# Patient Record
Sex: FEMALE | Race: WHITE | NOT HISPANIC OR LATINO
[De-identification: names, ages, dates, MRNs, and addresses within clinical notes are randomized per-mention and may not be internally consistent; named-entity substitution may affect disease eponyms.]

---

## 2017-09-12 ENCOUNTER — APPOINTMENT (OUTPATIENT)
Dept: MAMMOGRAPHY | Facility: IMAGING CENTER | Age: 62
End: 2017-09-12

## 2017-09-17 ENCOUNTER — TRANSCRIPTION ENCOUNTER (OUTPATIENT)
Age: 62
End: 2017-09-17

## 2017-09-26 ENCOUNTER — RESULT REVIEW (OUTPATIENT)
Age: 62
End: 2017-09-26

## 2017-09-27 ENCOUNTER — APPOINTMENT (OUTPATIENT)
Dept: OBGYN | Facility: CLINIC | Age: 62
End: 2017-09-27
Payer: COMMERCIAL

## 2017-09-27 PROCEDURE — 99396 PREV VISIT EST AGE 40-64: CPT

## 2017-10-03 ENCOUNTER — OUTPATIENT (OUTPATIENT)
Dept: OUTPATIENT SERVICES | Facility: HOSPITAL | Age: 62
LOS: 1 days | End: 2017-10-03
Payer: COMMERCIAL

## 2017-10-03 ENCOUNTER — APPOINTMENT (OUTPATIENT)
Dept: ULTRASOUND IMAGING | Facility: IMAGING CENTER | Age: 62
End: 2017-10-03
Payer: COMMERCIAL

## 2017-10-03 ENCOUNTER — APPOINTMENT (OUTPATIENT)
Dept: MAMMOGRAPHY | Facility: IMAGING CENTER | Age: 62
End: 2017-10-03
Payer: COMMERCIAL

## 2017-10-03 DIAGNOSIS — Z00.8 ENCOUNTER FOR OTHER GENERAL EXAMINATION: ICD-10-CM

## 2017-10-03 PROCEDURE — 77063 BREAST TOMOSYNTHESIS BI: CPT

## 2017-10-03 PROCEDURE — 77067 SCR MAMMO BI INCL CAD: CPT

## 2017-10-03 PROCEDURE — G0202: CPT | Mod: 26

## 2017-10-03 PROCEDURE — 77063 BREAST TOMOSYNTHESIS BI: CPT | Mod: 26

## 2017-10-03 PROCEDURE — 76641 ULTRASOUND BREAST COMPLETE: CPT | Mod: 26,50

## 2017-10-03 PROCEDURE — 76641 ULTRASOUND BREAST COMPLETE: CPT

## 2018-10-05 ENCOUNTER — APPOINTMENT (OUTPATIENT)
Dept: OBGYN | Facility: CLINIC | Age: 63
End: 2018-10-05
Payer: COMMERCIAL

## 2018-10-05 ENCOUNTER — RESULT REVIEW (OUTPATIENT)
Age: 63
End: 2018-10-05

## 2018-10-05 PROCEDURE — 99396 PREV VISIT EST AGE 40-64: CPT

## 2018-12-21 ENCOUNTER — OUTPATIENT (OUTPATIENT)
Dept: OUTPATIENT SERVICES | Facility: HOSPITAL | Age: 63
LOS: 1 days | End: 2018-12-21
Payer: COMMERCIAL

## 2018-12-21 ENCOUNTER — APPOINTMENT (OUTPATIENT)
Dept: ULTRASOUND IMAGING | Facility: IMAGING CENTER | Age: 63
End: 2018-12-21
Payer: COMMERCIAL

## 2018-12-21 ENCOUNTER — APPOINTMENT (OUTPATIENT)
Dept: MAMMOGRAPHY | Facility: IMAGING CENTER | Age: 63
End: 2018-12-21
Payer: COMMERCIAL

## 2018-12-21 ENCOUNTER — APPOINTMENT (OUTPATIENT)
Dept: RADIOLOGY | Facility: IMAGING CENTER | Age: 63
End: 2018-12-21
Payer: COMMERCIAL

## 2018-12-21 DIAGNOSIS — Z00.8 ENCOUNTER FOR OTHER GENERAL EXAMINATION: ICD-10-CM

## 2018-12-21 PROCEDURE — 77080 DXA BONE DENSITY AXIAL: CPT

## 2018-12-21 PROCEDURE — 77067 SCR MAMMO BI INCL CAD: CPT | Mod: 26

## 2018-12-21 PROCEDURE — 76641 ULTRASOUND BREAST COMPLETE: CPT | Mod: 26,50

## 2018-12-21 PROCEDURE — 77063 BREAST TOMOSYNTHESIS BI: CPT | Mod: 26

## 2018-12-21 PROCEDURE — 77063 BREAST TOMOSYNTHESIS BI: CPT

## 2018-12-21 PROCEDURE — 77080 DXA BONE DENSITY AXIAL: CPT | Mod: 26

## 2018-12-21 PROCEDURE — 77067 SCR MAMMO BI INCL CAD: CPT

## 2018-12-21 PROCEDURE — 76641 ULTRASOUND BREAST COMPLETE: CPT

## 2019-03-13 ENCOUNTER — APPOINTMENT (OUTPATIENT)
Dept: INTERNAL MEDICINE | Facility: CLINIC | Age: 64
End: 2019-03-13
Payer: COMMERCIAL

## 2019-03-13 VITALS
HEART RATE: 79 BPM | OXYGEN SATURATION: 99 % | HEIGHT: 65 IN | DIASTOLIC BLOOD PRESSURE: 80 MMHG | SYSTOLIC BLOOD PRESSURE: 154 MMHG | WEIGHT: 117 LBS | TEMPERATURE: 98.3 F | BODY MASS INDEX: 19.49 KG/M2

## 2019-03-13 VITALS — DIASTOLIC BLOOD PRESSURE: 78 MMHG | SYSTOLIC BLOOD PRESSURE: 140 MMHG

## 2019-03-13 DIAGNOSIS — Z87.891 PERSONAL HISTORY OF NICOTINE DEPENDENCE: ICD-10-CM

## 2019-03-13 DIAGNOSIS — Z78.9 OTHER SPECIFIED HEALTH STATUS: ICD-10-CM

## 2019-03-13 DIAGNOSIS — Z82.49 FAMILY HISTORY OF ISCHEMIC HEART DISEASE AND OTHER DISEASES OF THE CIRCULATORY SYSTEM: ICD-10-CM

## 2019-03-13 DIAGNOSIS — Z82.0 FAMILY HISTORY OF EPILEPSY AND OTHER DISEASES OF THE NERVOUS SYSTEM: ICD-10-CM

## 2019-03-13 PROCEDURE — G0444 DEPRESSION SCREEN ANNUAL: CPT

## 2019-03-13 PROCEDURE — 99214 OFFICE O/P EST MOD 30 MIN: CPT | Mod: 25

## 2019-03-13 PROCEDURE — G0442 ANNUAL ALCOHOL SCREEN 15 MIN: CPT

## 2019-03-13 PROCEDURE — 99386 PREV VISIT NEW AGE 40-64: CPT

## 2019-03-13 RX ORDER — IBUPROFEN 200 MG
CAPSULE ORAL
Refills: 0 | Status: ACTIVE | COMMUNITY

## 2019-03-13 NOTE — HEALTH RISK ASSESSMENT
[Good] : ~his/her~  mood as  good [] : No [No falls in past year] : Patient reported no falls in the past year [0] : 2) Feeling down, depressed, or hopeless: Not at all (0) [[id number]] : 0 [Change in mental status noted] : No change in mental status noted [Language] : denies difficulty with language [Behavior] : denies difficulty with behavior [Learning/Retaining New Information] : denies difficulty learning/retaining new information [Handling Complex Tasks] : denies difficulty handling complex tasks [Reasoning] : denies difficulty with reasoning [Spatial Ability and Orientation] : denies difficulty with spatial ability and orientation [None] : None [With Family] : lives with family [Employed] : employed [] :  [Sexually Active] : sexually active [High Risk Behavior] : no high risk behavior [Feels Safe at Home] : Feels safe at home [Fully functional (bathing, dressing, toileting, transferring, walking, feeding)] : Fully functional (bathing, dressing, toileting, transferring, walking, feeding) [Fully functional (using the telephone, shopping, preparing meals, housekeeping, doing laundry, using] : Fully functional and needs no help or supervision to perform IADLs (using the telephone, shopping, preparing meals, housekeeping, doing laundry, using transportation, managing medications and managing finances) [Reports changes in hearing] : Reports no changes in hearing [Reports changes in vision] : Reports no changes in vision [Reports normal functional visual acuity (ie: able to read med bottle)] : Reports normal functional visual acuity [Reports changes in dental health] : Reports no changes in dental health [Smoke Detector] : smoke detector [Carbon Monoxide Detector] : carbon monoxide detector [Guns at Home] : no guns at home [Safety elements used in home] : safety elements used in home [Seat Belt] :  uses seat belt [Sunscreen] : uses sunscreen [Travel to Developing Areas] : does not  travel to developing areas [TB Exposure] : is not being exposed to tuberculosis [Caregiver Concerns] : does not have caregiver concerns [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

## 2019-03-13 NOTE — HISTORY OF PRESENT ILLNESS
[FreeTextEntry1] : Presents as new patient to establish care for preventive visit as well as concerns regarding her blood pressure and osteopenia. [de-identified] : \par Preventive visit: patient says she already received the flu vaccine this season; she sees her GYN for PAP/pelvic exams; she gets her mammogram annually and was told results normal last year; last colonoscopy was 10 years ago so she is due this year for repeat; she does not smoke cigarettes and she does not misuse alcohol; she feels safe at home and has smoke/CO detectors in the house; she wears seatbelts when in vehicles\par \par Medical Issue 1: Elevated blood pressure without history of HTN: patient says she denies headache, chest pressure, chest pain, vision changes; she was never given antihypertensive medications in the past; she has a home BP monitor but does not check her BP; she admits to eating salty foods at times (ate rivera before coming to office today) but overall maintains a healthy body weight; her brother has HTN and takes medication for it\par \par Medical Issue 2: Osteopenia: pt says she had bone density done last year showing osteopenia; she has since been taking a Vitamin D supplement with calcium and trying to incorporate light weight-bearing exercise in her routine

## 2019-03-13 NOTE — ASSESSMENT
[FreeTextEntry1] : \par Preventive visit: patient says she already received the flu vaccine this season; she sees her GYN for PAP/pelvic exams; she gets her mammogram annually and was told results normal last year; last colonoscopy was 10 years ago so she is due this year for repeat, GI referral given today; praised pt's tobacco-free lifestyle; encouraged use of smoke/CO detectors in the house and use of seatbelts when in vehicles\par \par Medical Issue 1: Elevated blood pressure without history of HTN: counselled pt regarding lifestyle change including low salt diet; she will check her BP via home sphygnomometer twice weekly for the next month and send me results via patient portal with target 120/80 or less\par \par Medical Issue 2: Osteopenia: pt says she had bone density done last year showing osteopenia; check Vitamin D level with blood work; encouraged light weight-bearing exercise\par \par Depression screen performed today, PHQ2=0\par \par Annual alcohol misuse screen, 15 mins, done; negative

## 2019-03-22 ENCOUNTER — TRANSCRIPTION ENCOUNTER (OUTPATIENT)
Age: 64
End: 2019-03-22

## 2019-03-27 ENCOUNTER — TRANSCRIPTION ENCOUNTER (OUTPATIENT)
Age: 64
End: 2019-03-27

## 2019-03-28 ENCOUNTER — TRANSCRIPTION ENCOUNTER (OUTPATIENT)
Age: 64
End: 2019-03-28

## 2019-05-08 ENCOUNTER — APPOINTMENT (OUTPATIENT)
Dept: GASTROENTEROLOGY | Facility: CLINIC | Age: 64
End: 2019-05-08
Payer: COMMERCIAL

## 2019-05-08 VITALS
SYSTOLIC BLOOD PRESSURE: 145 MMHG | HEART RATE: 75 BPM | WEIGHT: 118 LBS | BODY MASS INDEX: 19.66 KG/M2 | HEIGHT: 65 IN | DIASTOLIC BLOOD PRESSURE: 77 MMHG

## 2019-05-08 DIAGNOSIS — Z12.11 ENCOUNTER FOR SCREENING FOR MALIGNANT NEOPLASM OF COLON: ICD-10-CM

## 2019-05-08 PROCEDURE — 99203 OFFICE O/P NEW LOW 30 MIN: CPT

## 2019-05-08 NOTE — CONSULT LETTER
[Dear  ___] : Dear  [unfilled], [Please see my note below.] : Please see my note below. [Consult Letter:] : I had the pleasure of evaluating your patient, [unfilled]. [Consult Closing:] : Thank you very much for allowing me to participate in the care of this patient.  If you have any questions, please do not hesitate to contact me. [Sincerely,] : Sincerely, [FreeTextEntry3] : Juanito Pollock MD\par Department of Gastroenterology\par [facility]\par [address] [place] [address], [facility] [address]\[place] [address]\par Tel: [phone]\par Email: [email or web address]

## 2019-05-08 NOTE — HISTORY OF PRESENT ILLNESS
[Heartburn] : denies heartburn [Nausea] : denies nausea [Vomiting] : denies vomiting [Diarrhea] : denies diarrhea [Constipation] : denies constipation [Yellow Skin Or Eyes (Jaundice)] : denies jaundice [Abdominal Pain] : denies abdominal pain [Abdominal Swelling] : denies abdominal swelling [Rectal Pain] : denies rectal pain [de-identified] : Amelia presents to the office today for evaluation for colon cancer screening.  \par \par She feels well from a GI perspective, and denies any dysphagia, nausea, abdominal pain, change in bowel habits, GI bleeding, weight loss, or family history of colon cancer.  She normally moves her bowels once a day.  She had a previous colonoscopy more than 10 years ago (records unavailable).\par

## 2019-05-08 NOTE — ASSESSMENT
[FreeTextEntry1] : 1.  Encounter for colon cancer screening in average risk patient.  Previous colonoscopy more than 10 years ago.\par 2.  Osteopenia.\par \par Recs:\par - Recent labs reviewed.\par - Patient was advised to undergo colonoscopy- procedure, risks, benefits, and alternatives were explained. Patient agreeable. Brochure given. Miralax prep.

## 2019-05-13 ENCOUNTER — LABORATORY RESULT (OUTPATIENT)
Age: 64
End: 2019-05-13

## 2019-05-14 ENCOUNTER — APPOINTMENT (OUTPATIENT)
Dept: GASTROENTEROLOGY | Facility: CLINIC | Age: 64
End: 2019-05-14
Payer: COMMERCIAL

## 2019-05-14 PROCEDURE — 45380 COLONOSCOPY AND BIOPSY: CPT | Mod: 33

## 2020-05-11 ENCOUNTER — FORM ENCOUNTER (OUTPATIENT)
Age: 65
End: 2020-05-11

## 2020-05-19 ENCOUNTER — RESULT REVIEW (OUTPATIENT)
Age: 65
End: 2020-05-19

## 2020-05-19 ENCOUNTER — APPOINTMENT (OUTPATIENT)
Dept: OBGYN | Facility: CLINIC | Age: 65
End: 2020-05-19
Payer: COMMERCIAL

## 2020-05-19 ENCOUNTER — FORM ENCOUNTER (OUTPATIENT)
Age: 65
End: 2020-05-19

## 2020-05-19 PROCEDURE — 99396 PREV VISIT EST AGE 40-64: CPT

## 2020-07-10 ENCOUNTER — APPOINTMENT (OUTPATIENT)
Dept: MAMMOGRAPHY | Facility: IMAGING CENTER | Age: 65
End: 2020-07-10
Payer: COMMERCIAL

## 2020-07-10 ENCOUNTER — RESULT REVIEW (OUTPATIENT)
Age: 65
End: 2020-07-10

## 2020-07-10 ENCOUNTER — APPOINTMENT (OUTPATIENT)
Dept: ULTRASOUND IMAGING | Facility: IMAGING CENTER | Age: 65
End: 2020-07-10
Payer: COMMERCIAL

## 2020-07-10 ENCOUNTER — OUTPATIENT (OUTPATIENT)
Dept: OUTPATIENT SERVICES | Facility: HOSPITAL | Age: 65
LOS: 1 days | End: 2020-07-10
Payer: COMMERCIAL

## 2020-07-10 DIAGNOSIS — Z00.8 ENCOUNTER FOR OTHER GENERAL EXAMINATION: ICD-10-CM

## 2020-07-10 PROCEDURE — 76641 ULTRASOUND BREAST COMPLETE: CPT

## 2020-07-10 PROCEDURE — 76641 ULTRASOUND BREAST COMPLETE: CPT | Mod: 26,50

## 2020-07-10 PROCEDURE — 77063 BREAST TOMOSYNTHESIS BI: CPT | Mod: 26

## 2020-07-10 PROCEDURE — 77063 BREAST TOMOSYNTHESIS BI: CPT

## 2020-07-10 PROCEDURE — 77067 SCR MAMMO BI INCL CAD: CPT | Mod: 26

## 2020-07-10 PROCEDURE — 77067 SCR MAMMO BI INCL CAD: CPT

## 2020-07-23 ENCOUNTER — FORM ENCOUNTER (OUTPATIENT)
Age: 65
End: 2020-07-23

## 2020-12-21 PROBLEM — Z12.11 ENCOUNTER FOR SCREENING FOR MALIGNANT NEOPLASM OF COLON: Status: RESOLVED | Noted: 2019-05-08 | Resolved: 2020-12-21

## 2021-03-31 ENCOUNTER — APPOINTMENT (OUTPATIENT)
Dept: INTERNAL MEDICINE | Facility: CLINIC | Age: 66
End: 2021-03-31
Payer: COMMERCIAL

## 2021-03-31 VITALS
OXYGEN SATURATION: 98 % | HEART RATE: 69 BPM | WEIGHT: 119 LBS | HEIGHT: 65 IN | DIASTOLIC BLOOD PRESSURE: 90 MMHG | TEMPERATURE: 97.3 F | SYSTOLIC BLOOD PRESSURE: 170 MMHG | BODY MASS INDEX: 19.83 KG/M2

## 2021-03-31 PROCEDURE — 99072 ADDL SUPL MATRL&STAF TM PHE: CPT

## 2021-03-31 PROCEDURE — 99396 PREV VISIT EST AGE 40-64: CPT

## 2021-03-31 PROCEDURE — 99214 OFFICE O/P EST MOD 30 MIN: CPT | Mod: 25

## 2021-03-31 NOTE — PHYSICAL EXAM
[No Acute Distress] : no acute distress [Well Nourished] : well nourished [Well Developed] : well developed [Well-Appearing] : well-appearing [Normal Sclera/Conjunctiva] : normal sclera/conjunctiva [PERRL] : pupils equal round and reactive to light [EOMI] : extraocular movements intact [No JVD] : no jugular venous distention [Supple] : supple [No Lymphadenopathy] : no lymphadenopathy [Thyroid Normal, No Nodules] : the thyroid was normal and there were no nodules present [No Respiratory Distress] : no respiratory distress  [Clear to Auscultation] : lungs were clear to auscultation bilaterally [No Accessory Muscle Use] : no accessory muscle use [Normal Rate] : normal rate  [Regular Rhythm] : with a regular rhythm [No Murmur] : no murmur heard [Normal S1, S2] : normal S1 and S2 [No Carotid Bruits] : no carotid bruits [No Abdominal Bruit] : a ~M bruit was not heard ~T in the abdomen [No Varicosities] : no varicosities [Pedal Pulses Present] : the pedal pulses are present [No Edema] : there was no peripheral edema [No Extremity Clubbing/Cyanosis] : no extremity clubbing/cyanosis [No Palpable Aorta] : no palpable aorta [Normal Appearance] : normal in appearance [No Nipple Discharge] : no nipple discharge [No Axillary Lymphadenopathy] : no axillary lymphadenopathy [Non Tender] : non-tender [Soft] : abdomen soft [Non-distended] : non-distended [No Masses] : no abdominal mass palpated [No HSM] : no HSM [Normal Bowel Sounds] : normal bowel sounds [Normal Posterior Cervical Nodes] : no posterior cervical lymphadenopathy [Normal Anterior Cervical Nodes] : no anterior cervical lymphadenopathy [No CVA Tenderness] : no CVA  tenderness [No Spinal Tenderness] : no spinal tenderness [No Joint Swelling] : no joint swelling [Grossly Normal Strength/Tone] : grossly normal strength/tone [No Rash] : no rash [Normal Gait] : normal gait [Coordination Grossly Intact] : coordination grossly intact [No Focal Deficits] : no focal deficits [Deep Tendon Reflexes (DTR)] : deep tendon reflexes were 2+ and symmetric [Normal Affect] : the affect was normal [Normal Insight/Judgement] : insight and judgment were intact

## 2021-03-31 NOTE — ASSESSMENT
[FreeTextEntry1] : \par Preventive visit: patient says she already received the flu vaccine this season; she sees her GYN for PAP/pelvic exams; she gets her mammogram annually and was told results normal last year; last colonoscopy was 10 years ago so she is due this year for repeat, GI referral given today; praised pt's tobacco-free lifestyle; encouraged use of smoke/CO detectors in the house and use of seatbelts when in vehicles\par \par Medical Issue 1: Elevated blood pressure without history of HTN: counselled pt regarding lifestyle change including low salt diet; she will check her BP via home sphygnomometer twice weekly for the next month and send me results via patient portal with target 120/80 or less; coordinated follow-up care with cardiology for beta-blocker treatment\par \par Medical Issue 2: Osteopenia: pt says she had bone density done 2 years ago showing osteopenia; check Vitamin D level with blood work; encouraged light weight-bearing exercise; she will get referral for repeat DEXA from her GYN this year\par \par Depression screen performed today, PHQ2=0\par \par Annual alcohol misuse screen, 15 mins, done; negative

## 2021-03-31 NOTE — HISTORY OF PRESENT ILLNESS
[FreeTextEntry1] : Presents for preventive visit as well as concerns regarding her blood pressure and osteopenia. [de-identified] : \par Preventive visit: patient says she already received the flu vaccine this season; she sees her GYN for PAP/pelvic exams; she gets her mammogram annually and was told results normal last year; last colonoscopy was 10 years ago so she is due this year for repeat; she does not smoke cigarettes and she does not misuse alcohol; she feels safe at home and has smoke/CO detectors in the house; she wears seatbelts when in vehicles\par \par Medical Issue 1: Elevated blood pressure without history of HTN: patient says she denies headache, chest pressure, chest pain, vision changes; she was never given antihypertensive medications in the past; she has a home BP monitor but does not check her BP; she admits to eating salty foods at times (ate rivera before coming to office today) but overall maintains a healthy body weight; her brother has HTN and takes medication for it; she has established care with cardiologist Dr. Akbar Ferrara and is taking Metoprolol now\par \par Medical Issue 2: Osteopenia: pt says she had bone density done last year showing osteopenia; she has since been taking a Vitamin D supplement with calcium and trying to incorporate light weight-bearing exercise in her routine

## 2021-03-31 NOTE — HEALTH RISK ASSESSMENT
[Good] : ~his/her~  mood as  good [No falls in past year] : Patient reported no falls in the past year [0] : 2) Feeling down, depressed, or hopeless: Not at all (0) [None] : None [With Family] : lives with family [Employed] : employed [] :  [Sexually Active] : sexually active [Feels Safe at Home] : Feels safe at home [Fully functional (bathing, dressing, toileting, transferring, walking, feeding)] : Fully functional (bathing, dressing, toileting, transferring, walking, feeding) [Fully functional (using the telephone, shopping, preparing meals, housekeeping, doing laundry, using] : Fully functional and needs no help or supervision to perform IADLs (using the telephone, shopping, preparing meals, housekeeping, doing laundry, using transportation, managing medications and managing finances) [Reports normal functional visual acuity (ie: able to read med bottle)] : Reports normal functional visual acuity [Smoke Detector] : smoke detector [Carbon Monoxide Detector] : carbon monoxide detector [Safety elements used in home] : safety elements used in home [Seat Belt] :  uses seat belt [Sunscreen] : uses sunscreen [Reviewed no changes] : Reviewed no changes [] : No [[id number]] : 0 [Change in mental status noted] : No change in mental status noted [Language] : denies difficulty with language [Behavior] : denies difficulty with behavior [Learning/Retaining New Information] : denies difficulty learning/retaining new information [Handling Complex Tasks] : denies difficulty handling complex tasks [Reasoning] : denies difficulty with reasoning [Spatial Ability and Orientation] : denies difficulty with spatial ability and orientation [High Risk Behavior] : no high risk behavior [Reports changes in hearing] : Reports no changes in hearing [Reports changes in vision] : Reports no changes in vision [Reports changes in dental health] : Reports no changes in dental health [Guns at Home] : no guns at home [Travel to Developing Areas] : does not  travel to developing areas [TB Exposure] : is not being exposed to tuberculosis [Caregiver Concerns] : does not have caregiver concerns [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

## 2021-10-04 ENCOUNTER — FORM ENCOUNTER (OUTPATIENT)
Age: 66
End: 2021-10-04

## 2021-10-05 ENCOUNTER — APPOINTMENT (OUTPATIENT)
Dept: OBGYN | Facility: CLINIC | Age: 66
End: 2021-10-05
Payer: COMMERCIAL

## 2021-10-05 ENCOUNTER — RESULT REVIEW (OUTPATIENT)
Age: 66
End: 2021-10-05

## 2021-10-05 DIAGNOSIS — B00.9 HERPESVIRAL INFECTION, UNSPECIFIED: ICD-10-CM

## 2021-10-05 PROCEDURE — 99396 PREV VISIT EST AGE 40-64: CPT

## 2021-10-08 ENCOUNTER — FORM ENCOUNTER (OUTPATIENT)
Age: 66
End: 2021-10-08

## 2021-10-27 ENCOUNTER — RESULT REVIEW (OUTPATIENT)
Age: 66
End: 2021-10-27

## 2021-10-27 ENCOUNTER — APPOINTMENT (OUTPATIENT)
Dept: RADIOLOGY | Facility: IMAGING CENTER | Age: 66
End: 2021-10-27
Payer: COMMERCIAL

## 2021-10-27 ENCOUNTER — APPOINTMENT (OUTPATIENT)
Dept: ULTRASOUND IMAGING | Facility: IMAGING CENTER | Age: 66
End: 2021-10-27
Payer: COMMERCIAL

## 2021-10-27 ENCOUNTER — APPOINTMENT (OUTPATIENT)
Dept: MAMMOGRAPHY | Facility: IMAGING CENTER | Age: 66
End: 2021-10-27
Payer: COMMERCIAL

## 2021-10-27 ENCOUNTER — OUTPATIENT (OUTPATIENT)
Dept: OUTPATIENT SERVICES | Facility: HOSPITAL | Age: 66
LOS: 1 days | End: 2021-10-27
Payer: COMMERCIAL

## 2021-10-27 DIAGNOSIS — Z00.8 ENCOUNTER FOR OTHER GENERAL EXAMINATION: ICD-10-CM

## 2021-10-27 PROCEDURE — 77067 SCR MAMMO BI INCL CAD: CPT

## 2021-10-27 PROCEDURE — 77063 BREAST TOMOSYNTHESIS BI: CPT

## 2021-10-27 PROCEDURE — 77063 BREAST TOMOSYNTHESIS BI: CPT | Mod: 26

## 2021-10-27 PROCEDURE — 76641 ULTRASOUND BREAST COMPLETE: CPT | Mod: 26,50

## 2021-10-27 PROCEDURE — 77067 SCR MAMMO BI INCL CAD: CPT | Mod: 26

## 2021-10-27 PROCEDURE — 76641 ULTRASOUND BREAST COMPLETE: CPT

## 2021-10-27 PROCEDURE — 77080 DXA BONE DENSITY AXIAL: CPT | Mod: 26

## 2021-10-27 PROCEDURE — 77080 DXA BONE DENSITY AXIAL: CPT

## 2022-06-01 ENCOUNTER — APPOINTMENT (OUTPATIENT)
Dept: INTERNAL MEDICINE | Facility: CLINIC | Age: 67
End: 2022-06-01
Payer: MEDICARE

## 2022-06-01 VITALS
HEART RATE: 72 BPM | HEIGHT: 65 IN | TEMPERATURE: 97.6 F | DIASTOLIC BLOOD PRESSURE: 69 MMHG | WEIGHT: 126 LBS | SYSTOLIC BLOOD PRESSURE: 154 MMHG | BODY MASS INDEX: 20.99 KG/M2 | OXYGEN SATURATION: 96 %

## 2022-06-01 DIAGNOSIS — Z13.31 ENCOUNTER FOR SCREENING FOR DEPRESSION: ICD-10-CM

## 2022-06-01 DIAGNOSIS — Z13.39 ENCOUNTER FOR SCREENING EXAM FOR OTHER MENTAL HEALTH AND BEHAVIORAL DISORDERS: ICD-10-CM

## 2022-06-01 PROCEDURE — G0442 ANNUAL ALCOHOL SCREEN 15 MIN: CPT | Mod: 59

## 2022-06-01 PROCEDURE — G0444 DEPRESSION SCREEN ANNUAL: CPT | Mod: 25,59

## 2022-06-01 PROCEDURE — G0402 INITIAL PREVENTIVE EXAM: CPT

## 2022-06-01 PROCEDURE — 99215 OFFICE O/P EST HI 40 MIN: CPT | Mod: 25

## 2022-06-01 PROCEDURE — G0439: CPT

## 2022-06-01 RX ORDER — METOPROLOL SUCCINATE 50 MG/1
50 TABLET, EXTENDED RELEASE ORAL
Qty: 180 | Refills: 0 | Status: ACTIVE | COMMUNITY
Start: 2022-04-12

## 2022-06-01 NOTE — HISTORY OF PRESENT ILLNESS
[FreeTextEntry1] : Presents for preventive visit as well as concerns regarding her blood pressure and osteopenia. [de-identified] : \par Preventive visit: patient says she already received the flu vaccine this season; she sees her GYN for PAP/pelvic exams; she gets her mammogram annually and was told results normal last year; last colonoscopy was in 2019 years ago so she is due in 2029 for repeat; she does not smoke cigarettes and she does not misuse alcohol; she feels safe at home and has smoke/CO detectors in the house; she wears seatbelts when in vehicles\par \par Medical Issue 1: Elevated blood pressure without history of HTN: patient says she denies headache, chest pressure, chest pain, vision changes; she was never given antihypertensive medications in the past; she has a home BP monitor but does not check her BP; she admits to eating salty foods at times (ate rivera before coming to office today) but overall maintains a healthy body weight; her brother has HTN and takes medication for it; she has established care with cardiologist Dr. Akbra Ferrara and is taking Metoprolol now\par \par Medical Issue 2: Osteopenia: pt says she had bone density done last year showing osteopenia; she has since been taking a Vitamin D supplement with calcium and trying to incorporate light weight-bearing exercise in her routine

## 2022-06-01 NOTE — HEALTH RISK ASSESSMENT
[Good] : ~his/her~  mood as  good [No] : No [1 or 2 (0 pts)] : 1 or 2 (0 points) [Never (0 pts)] : Never (0 points) [No falls in past year] : Patient reported no falls in the past year [0] : 2) Feeling down, depressed, or hopeless: Not at all (0) [PHQ-2 Negative - No further assessment needed] : PHQ-2 Negative - No further assessment needed [None] : None [With Family] : lives with family [Employed] : employed [] :  [Sexually Active] : sexually active [Feels Safe at Home] : Feels safe at home [Fully functional (bathing, dressing, toileting, transferring, walking, feeding)] : Fully functional (bathing, dressing, toileting, transferring, walking, feeding) [Fully functional (using the telephone, shopping, preparing meals, housekeeping, doing laundry, using] : Fully functional and needs no help or supervision to perform IADLs (using the telephone, shopping, preparing meals, housekeeping, doing laundry, using transportation, managing medications and managing finances) [Reports normal functional visual acuity (ie: able to read med bottle)] : Reports normal functional visual acuity [Smoke Detector] : smoke detector [Carbon Monoxide Detector] : carbon monoxide detector [Safety elements used in home] : safety elements used in home [Seat Belt] :  uses seat belt [Sunscreen] : uses sunscreen [Audit-CScore] : 0 [[id number]] : 0 [Change in mental status noted] : No change in mental status noted [Language] : denies difficulty with language [Behavior] : denies difficulty with behavior [Learning/Retaining New Information] : denies difficulty learning/retaining new information [Handling Complex Tasks] : denies difficulty handling complex tasks [Reasoning] : denies difficulty with reasoning [Spatial Ability and Orientation] : denies difficulty with spatial ability and orientation [High Risk Behavior] : no high risk behavior [Reports changes in hearing] : Reports no changes in hearing [Reports changes in vision] : Reports no changes in vision [Reports changes in dental health] : Reports no changes in dental health [Guns at Home] : no guns at home [Travel to Developing Areas] : does not  travel to developing areas [TB Exposure] : is not being exposed to tuberculosis [Caregiver Concerns] : does not have caregiver concerns [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

## 2022-06-01 NOTE — ASSESSMENT
[FreeTextEntry1] :  Preventive visit: patient says she already received the flu vaccine this season; she sees her GYN for PAP/pelvic exams; she gets her mammogram annually and was told results normal last year; last colonoscopy was in 2019  so she is due in 2029 for repeat; praised pt's tobacco-free lifestyle; encouraged use of smoke/CO detectors in the house and use of seatbelts when in vehicles  \par \par Medical Issue 1: Elevated blood pressure without history of HTN: counselled pt regarding lifestyle change including low salt diet; she will check her BP via home sphygomometer twice weekly for the next month and send me results via patient portal with target 120/80 or less; coordinated follow-up care with cardiology for beta-blocker treatment  \par \par Medical Issue 2: Osteopenia: pt says she had bone density done last year showing osteopenia; check Vitamin D level with blood work; encouraged light weight-bearing exercise; repeat next year in 2023\par \par   Depression screen performed today, PHQ2=0  \par \par Annual alcohol misuse screen, 15 mins, done; negative

## 2022-06-20 ENCOUNTER — TRANSCRIPTION ENCOUNTER (OUTPATIENT)
Age: 67
End: 2022-06-20

## 2022-06-20 LAB
25(OH)D3 SERPL-MCNC: 40.2 NG/ML
ALBUMIN SERPL ELPH-MCNC: 4.7 G/DL
ALP BLD-CCNC: 49 U/L
ALT SERPL-CCNC: 15 U/L
ANION GAP SERPL CALC-SCNC: 12 MMOL/L
AST SERPL-CCNC: 24 U/L
BASOPHILS # BLD AUTO: 0.07 K/UL
BASOPHILS NFR BLD AUTO: 1.3 %
BILIRUB SERPL-MCNC: <0.2 MG/DL
BUN SERPL-MCNC: 18 MG/DL
CALCIUM SERPL-MCNC: 9.6 MG/DL
CHLORIDE SERPL-SCNC: 104 MMOL/L
CHOLEST SERPL-MCNC: 201 MG/DL
CO2 SERPL-SCNC: 26 MMOL/L
CREAT SERPL-MCNC: 0.79 MG/DL
EGFR: 83 ML/MIN/1.73M2
EOSINOPHIL # BLD AUTO: 0.15 K/UL
EOSINOPHIL NFR BLD AUTO: 2.8 %
ESTIMATED AVERAGE GLUCOSE: 111 MG/DL
GLUCOSE SERPL-MCNC: 116 MG/DL
HBA1C MFR BLD HPLC: 5.5 %
HCT VFR BLD CALC: 40.7 %
HDLC SERPL-MCNC: 58 MG/DL
HGB BLD-MCNC: 13.4 G/DL
IMM GRANULOCYTES NFR BLD AUTO: 0.2 %
LDLC SERPL CALC-MCNC: 112 MG/DL
LYMPHOCYTES # BLD AUTO: 2.23 K/UL
LYMPHOCYTES NFR BLD AUTO: 42.3 %
MAN DIFF?: NORMAL
MCHC RBC-ENTMCNC: 31.1 PG
MCHC RBC-ENTMCNC: 32.9 GM/DL
MCV RBC AUTO: 94.4 FL
MONOCYTES # BLD AUTO: 0.47 K/UL
MONOCYTES NFR BLD AUTO: 8.9 %
NEUTROPHILS # BLD AUTO: 2.34 K/UL
NEUTROPHILS NFR BLD AUTO: 44.5 %
NONHDLC SERPL-MCNC: 143 MG/DL
PLATELET # BLD AUTO: 309 K/UL
POTASSIUM SERPL-SCNC: 5.1 MMOL/L
PROT SERPL-MCNC: 7.1 G/DL
RBC # BLD: 4.31 M/UL
RBC # FLD: 12.4 %
SODIUM SERPL-SCNC: 142 MMOL/L
TRIGL SERPL-MCNC: 155 MG/DL
TSH SERPL-ACNC: 2.51 UIU/ML
WBC # FLD AUTO: 5.27 K/UL

## 2022-08-26 PROBLEM — B00.9 HSV INFECTION: Status: ACTIVE | Noted: 2022-08-26

## 2022-10-06 ENCOUNTER — APPOINTMENT (OUTPATIENT)
Dept: OBGYN | Facility: CLINIC | Age: 67
End: 2022-10-06

## 2022-10-06 VITALS
WEIGHT: 125 LBS | BODY MASS INDEX: 20.83 KG/M2 | HEIGHT: 65 IN | DIASTOLIC BLOOD PRESSURE: 84 MMHG | HEART RATE: 64 BPM | SYSTOLIC BLOOD PRESSURE: 150 MMHG | RESPIRATION RATE: 14 BRPM | OXYGEN SATURATION: 97 %

## 2022-10-06 DIAGNOSIS — Z80.3 FAMILY HISTORY OF MALIGNANT NEOPLASM OF BREAST: ICD-10-CM

## 2022-10-06 DIAGNOSIS — Z80.41 FAMILY HISTORY OF MALIGNANT NEOPLASM OF OVARY: ICD-10-CM

## 2022-10-06 DIAGNOSIS — Z12.31 ENCOUNTER FOR SCREENING MAMMOGRAM FOR MALIGNANT NEOPLASM OF BREAST: ICD-10-CM

## 2022-10-06 DIAGNOSIS — Z01.419 ENCOUNTER FOR GYNECOLOGICAL EXAMINATION (GENERAL) (ROUTINE) W/OUT ABNORMAL FINDINGS: ICD-10-CM

## 2022-10-06 PROCEDURE — G0101: CPT

## 2022-10-06 NOTE — PLAN
[FreeTextEntry1] : HCM\par -SBE\par -pap & HPV \par -Rx mammo/sono given\par -MVI, Calcium, Vit d\par -Weight/exercise\par RTO 1 year\par

## 2022-10-06 NOTE — PHYSICAL EXAM
[Chaperone Present] : A chaperone was present in the examining room during all aspects of the physical examination [Appropriately responsive] : appropriately responsive [Alert] : alert [No Acute Distress] : no acute distress [No Lymphadenopathy] : no lymphadenopathy [Soft] : soft [Non-tender] : non-tender [Non-distended] : non-distended [No Lesions] : no lesions [No Mass] : no mass [Oriented x3] : oriented x3 [Examination Of The Breasts] : a normal appearance [No Masses] : no breast masses were palpable [Labia Majora] : normal [Labia Minora] : normal [Normal] : normal [Uterine Adnexae] : normal [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 rate wnl, color pink, no SOB

## 2022-10-06 NOTE — HISTORY OF PRESENT ILLNESS
[TextBox_4] : 66yo  presents for routine gyn exam.  No complaints. Continues to work in [employer].\par \par Info. from prior EMR:\par Demographics: Race: White Ethnicity: Not  or  Native Language: English Occupation:  home/[employer]\par : 4 Para: 4 0 0 4 Menopause: Age: 7\par OB History: 4 (S)\par \par GYN History: No significant GYN history.\par Menarche Age: 16 Sexually Active \par PMH\par No significant past medical history.\par Surgical History: Tubal Ligation x1993\par Allergies: Codeine,\par Current Medications: Prescribed/Suppliments/OTC MULTIVITAMINS, CITRACEL, metropolol\par Medications Verified Medications Verified\par Last PAP: 10/05/2018 -- Pap neg atrophy/HPV neg 10/10/18 LL Last Mammo: 2019 - mammogram and breast songram stable Last Dexa: 2019 - osteopenia Last Colpo: 2018 - wnl Last Breast Sono:: 2019 - need additional imaging\par Family Hx\par Breast Cancer and Ovarian cancer: MAUNT in 50s\par \par \par Personal history of blood clots/DVT/PE: no\par Personal history of conditions causing increased risk of blood clots/DVT/PE: no\par Family history of blood clots/DVT/PE: no\par Family history of conditions causing increased risk of blood clots/DVT/PE: no\par  [Mammogramdate] : 10/2021 [TextBox_19] : wnl [BreastSonogramDate] : 10/2021 [TextBox_25] : wnl [PapSmeardate] : 10/2021 [TextBox_31] : wnl

## 2022-10-15 LAB
CYTOLOGY CVX/VAG DOC THIN PREP: ABNORMAL
HPV HIGH+LOW RISK DNA PNL CVX: NOT DETECTED

## 2022-10-28 ENCOUNTER — APPOINTMENT (OUTPATIENT)
Dept: MAMMOGRAPHY | Facility: IMAGING CENTER | Age: 67
End: 2022-10-28

## 2022-10-28 ENCOUNTER — RESULT REVIEW (OUTPATIENT)
Age: 67
End: 2022-10-28

## 2022-10-28 ENCOUNTER — APPOINTMENT (OUTPATIENT)
Dept: ULTRASOUND IMAGING | Facility: IMAGING CENTER | Age: 67
End: 2022-10-28

## 2022-10-28 ENCOUNTER — OUTPATIENT (OUTPATIENT)
Dept: OUTPATIENT SERVICES | Facility: HOSPITAL | Age: 67
LOS: 1 days | End: 2022-10-28
Payer: MEDICARE

## 2022-10-28 DIAGNOSIS — Z12.31 ENCOUNTER FOR SCREENING MAMMOGRAM FOR MALIGNANT NEOPLASM OF BREAST: ICD-10-CM

## 2022-10-28 DIAGNOSIS — Z80.3 FAMILY HISTORY OF MALIGNANT NEOPLASM OF BREAST: ICD-10-CM

## 2022-10-28 PROCEDURE — 76641 ULTRASOUND BREAST COMPLETE: CPT

## 2022-10-28 PROCEDURE — 77067 SCR MAMMO BI INCL CAD: CPT | Mod: 26

## 2022-10-28 PROCEDURE — 77063 BREAST TOMOSYNTHESIS BI: CPT

## 2022-10-28 PROCEDURE — 77067 SCR MAMMO BI INCL CAD: CPT

## 2022-10-28 PROCEDURE — 77063 BREAST TOMOSYNTHESIS BI: CPT | Mod: 26

## 2022-10-28 PROCEDURE — 76641 ULTRASOUND BREAST COMPLETE: CPT | Mod: 26,50

## 2022-11-04 ENCOUNTER — RESULT REVIEW (OUTPATIENT)
Age: 67
End: 2022-11-04

## 2022-11-04 ENCOUNTER — APPOINTMENT (OUTPATIENT)
Dept: MAMMOGRAPHY | Facility: IMAGING CENTER | Age: 67
End: 2022-11-04

## 2022-11-04 ENCOUNTER — APPOINTMENT (OUTPATIENT)
Dept: ULTRASOUND IMAGING | Facility: IMAGING CENTER | Age: 67
End: 2022-11-04

## 2022-11-04 ENCOUNTER — OUTPATIENT (OUTPATIENT)
Dept: OUTPATIENT SERVICES | Facility: HOSPITAL | Age: 67
LOS: 1 days | End: 2022-11-04
Payer: MEDICARE

## 2022-11-04 DIAGNOSIS — Z00.8 ENCOUNTER FOR OTHER GENERAL EXAMINATION: ICD-10-CM

## 2022-11-04 PROCEDURE — G0279: CPT

## 2022-11-04 PROCEDURE — 77065 DX MAMMO INCL CAD UNI: CPT | Mod: 26,RT

## 2022-11-04 PROCEDURE — 76642 ULTRASOUND BREAST LIMITED: CPT | Mod: 26,RT

## 2022-11-04 PROCEDURE — 77065 DX MAMMO INCL CAD UNI: CPT

## 2022-11-04 PROCEDURE — G0279: CPT | Mod: 26

## 2022-11-04 PROCEDURE — 76642 ULTRASOUND BREAST LIMITED: CPT

## 2022-11-11 ENCOUNTER — APPOINTMENT (OUTPATIENT)
Dept: ULTRASOUND IMAGING | Facility: IMAGING CENTER | Age: 67
End: 2022-11-11

## 2022-11-11 ENCOUNTER — OUTPATIENT (OUTPATIENT)
Dept: OUTPATIENT SERVICES | Facility: HOSPITAL | Age: 67
LOS: 1 days | End: 2022-11-11
Payer: MEDICARE

## 2022-11-11 ENCOUNTER — RESULT REVIEW (OUTPATIENT)
Age: 67
End: 2022-11-11

## 2022-11-11 DIAGNOSIS — R92.8 OTHER ABNORMAL AND INCONCLUSIVE FINDINGS ON DIAGNOSTIC IMAGING OF BREAST: ICD-10-CM

## 2022-11-11 PROCEDURE — 77065 DX MAMMO INCL CAD UNI: CPT

## 2022-11-11 PROCEDURE — 88360 TUMOR IMMUNOHISTOCHEM/MANUAL: CPT | Mod: 26

## 2022-11-11 PROCEDURE — 88305 TISSUE EXAM BY PATHOLOGIST: CPT

## 2022-11-11 PROCEDURE — 77065 DX MAMMO INCL CAD UNI: CPT | Mod: 26,RT

## 2022-11-11 PROCEDURE — 88305 TISSUE EXAM BY PATHOLOGIST: CPT | Mod: 26

## 2022-11-11 PROCEDURE — A4648: CPT

## 2022-11-11 PROCEDURE — 19083 BX BREAST 1ST LESION US IMAG: CPT

## 2022-11-11 PROCEDURE — 88360 TUMOR IMMUNOHISTOCHEM/MANUAL: CPT

## 2022-11-11 PROCEDURE — 19083 BX BREAST 1ST LESION US IMAG: CPT | Mod: RT

## 2022-11-14 LAB — SURGICAL PATHOLOGY STUDY: SIGNIFICANT CHANGE UP

## 2022-11-15 ENCOUNTER — NON-APPOINTMENT (OUTPATIENT)
Age: 67
End: 2022-11-15

## 2022-11-16 ENCOUNTER — NON-APPOINTMENT (OUTPATIENT)
Age: 67
End: 2022-11-16

## 2022-11-17 ENCOUNTER — NON-APPOINTMENT (OUTPATIENT)
Age: 67
End: 2022-11-17

## 2022-11-17 ENCOUNTER — APPOINTMENT (OUTPATIENT)
Dept: SURGICAL ONCOLOGY | Facility: CLINIC | Age: 67
End: 2022-11-17

## 2022-11-17 VITALS
RESPIRATION RATE: 15 BRPM | BODY MASS INDEX: 20.99 KG/M2 | DIASTOLIC BLOOD PRESSURE: 79 MMHG | WEIGHT: 126 LBS | OXYGEN SATURATION: 98 % | HEIGHT: 65 IN | SYSTOLIC BLOOD PRESSURE: 173 MMHG | HEART RATE: 61 BPM

## 2022-11-17 DIAGNOSIS — Z80.3 FAMILY HISTORY OF MALIGNANT NEOPLASM OF BREAST: ICD-10-CM

## 2022-11-17 DIAGNOSIS — Z80.41 FAMILY HISTORY OF MALIGNANT NEOPLASM OF OVARY: ICD-10-CM

## 2022-11-17 PROCEDURE — 99205 OFFICE O/P NEW HI 60 MIN: CPT

## 2022-11-21 PROBLEM — Z80.41 FAMILY HISTORY OF MALIGNANT NEOPLASM OF OVARY: Status: ACTIVE | Noted: 2022-11-21

## 2022-11-21 PROBLEM — Z80.3 FAMILY HISTORY OF MALIGNANT NEOPLASM OF FEMALE BREAST: Status: ACTIVE | Noted: 2022-11-21

## 2022-11-21 NOTE — REASON FOR VISIT
[Initial Consultation] : an initial consultation for [Breast Cancer] : breast cancer [Spouse] : spouse [Family Member] : family member

## 2022-11-21 NOTE — PHYSICAL EXAM
[Normal] : supple, no neck mass and thyroid not enlarged [Normal Neck Lymph Nodes] : normal neck lymph nodes  [Normal Supraclavicular Lymph Nodes] : normal supraclavicular lymph nodes [Normal Axillary Lymph Nodes] : normal axillary lymph nodes [Normal] : oriented to person, place and time, with appropriate affect [de-identified] : Right retroareolar post-biopsy changes/hematoma,  no nipple discharge, no additional suspicious masses.  Left breast with normal breast exam.  No lymphadenopathy bilaterally.

## 2022-11-21 NOTE — ASSESSMENT
[FreeTextEntry1] : 67yF with newly diagnosed clinical stage 1;  5mm Right breast invasive mammary carcinoma (retroareolar), ER 99%, MD 30%, HER2 neg.  \par \par We had a lengthy discussion regarding the natural history of breast cancer, her pathology results, her breast imaging, staging, surgical options and adjuvant treatment. \par \par We discussed surgical options of Right lumpectomy vs mastectomy with Right sentinel lymph node biopsy for axillary staging.  We discussed risks and benefits of each surgical option.   She understands radiation will be recommended after lumpectomy.  \par \par Surgical plan:   Right breast deepthi-localized partial mastectomy (lumpectomy) with Right sentinel lymph node biopsy.\par Date:  11/28/2022.\par \par Genetic testing - drawn today in office.   in process.    We discussed that a positive (pathogenic) result may affect surgical decision.  \par \par Referred for presurgical testing and medical clearance.  \par \par \par

## 2022-11-21 NOTE — OB HISTORY
[Postmenopausal] : The patient is postmenopausal [Menarche Age ____] : menarche age [unfilled] [Menopause Age____] : age at menopause was [unfilled] [Total Preg ___] : G[unfilled] [Live Births ___] : P[unfilled]  [FreeTextEntry2] : Age at first pregnancy - 28.

## 2022-11-21 NOTE — HISTORY OF PRESENT ILLNESS
[de-identified] : 68 y/o F who was recently dx with Invasive mammary carcinoma   Right 12:00-retroareolar,   cT1 N0, +/+/-. \par Initially found on screening mammogram.  denies breast mass, breast pain, nipple discharge or systemic symptoms.  \par \par Fam hx:   MAunt breast cancer 53,   MGM ovarian cancer 88, \par

## 2022-11-21 NOTE — RESULTS/DATA
[FreeTextEntry1] : Breast rad/path review:  \par 7/10/20 BL Sc MG/US - birads2 - heterogenously dense\par 10/27/21 BL Sc MG/US - birads 2 - scattered density\par 11/2/22 BL Sc MG/US - birads0 - scattered density,   Right 12:00RA 4mm mass.   Left breast normal.\par 11/11/22 R Dx MG/US - birads4 - Right 12:00RA 5mm angular hypoechoic mass.   Normal right axilla.\par 11/11/22 Right 12RA USG Core bx:  Invasive mammary carcinoma, mod diff,  ER99%, PR30%, HER2 Neg;  ribbon clip-concordant

## 2022-11-22 ENCOUNTER — APPOINTMENT (OUTPATIENT)
Dept: INTERNAL MEDICINE | Facility: CLINIC | Age: 67
End: 2022-11-22

## 2022-11-22 VITALS
HEART RATE: 68 BPM | HEIGHT: 65 IN | WEIGHT: 130 LBS | SYSTOLIC BLOOD PRESSURE: 152 MMHG | TEMPERATURE: 97.3 F | DIASTOLIC BLOOD PRESSURE: 78 MMHG | BODY MASS INDEX: 21.66 KG/M2 | OXYGEN SATURATION: 98 %

## 2022-11-22 VITALS
HEIGHT: 65 IN | SYSTOLIC BLOOD PRESSURE: 171 MMHG | OXYGEN SATURATION: 98 % | BODY MASS INDEX: 21.66 KG/M2 | HEART RATE: 68 BPM | WEIGHT: 130 LBS | TEMPERATURE: 97.3 F | DIASTOLIC BLOOD PRESSURE: 78 MMHG

## 2022-11-22 DIAGNOSIS — R92.8 OTHER ABNORMAL AND INCONCLUSIVE FINDINGS ON DIAGNOSTIC IMAGING OF BREAST: ICD-10-CM

## 2022-11-22 DIAGNOSIS — Z01.818 ENCOUNTER FOR OTHER PREPROCEDURAL EXAMINATION: ICD-10-CM

## 2022-11-22 PROCEDURE — 99215 OFFICE O/P EST HI 40 MIN: CPT

## 2022-11-22 NOTE — HISTORY OF PRESENT ILLNESS
[No Pertinent Cardiac History] : no history of aortic stenosis, atrial fibrillation, coronary artery disease, recent myocardial infarction, or implantable device/pacemaker [No Pertinent Pulmonary History] : no history of asthma, COPD, sleep apnea, or smoking [No Adverse Anesthesia Reaction] : no adverse anesthesia reaction in self or family member [(Patient denies any chest pain, claudication, dyspnea on exertion, orthopnea, palpitations or syncope)] : Patient denies any chest pain, claudication, dyspnea on exertion, orthopnea, palpitations or syncope [Excellent (>10 METs)] : Excellent (>10 METs) [Aortic Stenosis] : no aortic stenosis [Atrial Fibrillation] : no atrial fibrillation [Coronary Artery Disease] : no coronary artery disease [Recent Myocardial Infarction] : no recent myocardial infarction [Implantable Device/Pacemaker] : no implantable device/pacemaker [Asthma] : no asthma [COPD] : no COPD [Sleep Apnea] : no sleep apnea [Smoker] : not a smoker [Family Member] : no family member with adverse anesthesia reaction/sudden death [Self] : no previous adverse anesthesia reaction [Chronic Anticoagulation] : no chronic anticoagulation [Chronic Kidney Disease] : no chronic kidney disease [Diabetes] : no diabetes [FreeTextEntry1] : lumpectomy [FreeTextEntry2] : 11/28/22 [FreeTextEntry3] : Dr. Sylvia Reyes

## 2022-11-22 NOTE — ASSESSMENT
[High Risk Surgery - Intraperitoneal, Intrathoracic or Supringuinal Vascular Procedures] : High Risk Surgery - Intraperitoneal, Intrathoracic or Supringuinal Vascular Procedures - No (0) [Ischemic Heart Disease] : Ischemic Heart Disease - No (0) [Congestive Heart Failure] : Congestive Heart Failure - No (0) [Prior Cerebrovascular Accident or TIA] : Prior Cerebrovascular Accident or TIA - No (0) [Creatinine >= 2mg/dL (1 Point)] : Creatinine >= 2mg/dL - No (0) [Insulin-dependent Diabetic (1 Point)] : Insulin-dependent Diabetic - No (0) [0] : 0 , RCRI Class: I, Risk of Post-Op Cardiac Complications: 3.9%, 95% CI for Risk Estimate: 2.8% - 5.4% [Patient Optimized for Surgery] : Patient optimized for surgery [No Further Testing Recommended] : no further testing recommended [FreeTextEntry4] : \par Increase Losartan from 25mg to 50mg QD and continue Metoprolol for optimal BP control.

## 2022-11-23 ENCOUNTER — OUTPATIENT (OUTPATIENT)
Dept: OUTPATIENT SERVICES | Facility: HOSPITAL | Age: 67
LOS: 1 days | End: 2022-11-23

## 2022-11-23 VITALS
WEIGHT: 130.07 LBS | DIASTOLIC BLOOD PRESSURE: 80 MMHG | SYSTOLIC BLOOD PRESSURE: 144 MMHG | RESPIRATION RATE: 16 BRPM | HEIGHT: 65 IN | HEART RATE: 78 BPM | OXYGEN SATURATION: 98 % | TEMPERATURE: 98 F

## 2022-11-23 DIAGNOSIS — I10 ESSENTIAL (PRIMARY) HYPERTENSION: ICD-10-CM

## 2022-11-23 DIAGNOSIS — C50.112 MALIGNANT NEOPLASM OF CENTRAL PORTION OF LEFT FEMALE BREAST: ICD-10-CM

## 2022-11-23 DIAGNOSIS — Z98.51 TUBAL LIGATION STATUS: Chronic | ICD-10-CM

## 2022-11-23 DIAGNOSIS — C50.111 MALIGNANT NEOPLASM OF CENTRAL PORTION OF RIGHT FEMALE BREAST: ICD-10-CM

## 2022-11-23 LAB
ALBUMIN SERPL ELPH-MCNC: 4.5 G/DL — SIGNIFICANT CHANGE UP (ref 3.3–5)
ALP SERPL-CCNC: 49 U/L — SIGNIFICANT CHANGE UP (ref 40–120)
ALT FLD-CCNC: 10 U/L — SIGNIFICANT CHANGE UP (ref 4–33)
ANION GAP SERPL CALC-SCNC: 10 MMOL/L — SIGNIFICANT CHANGE UP (ref 7–14)
AST SERPL-CCNC: 16 U/L — SIGNIFICANT CHANGE UP (ref 4–32)
BILIRUB SERPL-MCNC: 0.5 MG/DL — SIGNIFICANT CHANGE UP (ref 0.2–1.2)
BUN SERPL-MCNC: 14 MG/DL — SIGNIFICANT CHANGE UP (ref 7–23)
CALCIUM SERPL-MCNC: 9.7 MG/DL — SIGNIFICANT CHANGE UP (ref 8.4–10.5)
CHLORIDE SERPL-SCNC: 103 MMOL/L — SIGNIFICANT CHANGE UP (ref 98–107)
CO2 SERPL-SCNC: 28 MMOL/L — SIGNIFICANT CHANGE UP (ref 22–31)
CREAT SERPL-MCNC: 0.82 MG/DL — SIGNIFICANT CHANGE UP (ref 0.5–1.3)
EGFR: 79 ML/MIN/1.73M2 — SIGNIFICANT CHANGE UP
GLUCOSE SERPL-MCNC: 100 MG/DL — HIGH (ref 70–99)
HCT VFR BLD CALC: 37.5 % — SIGNIFICANT CHANGE UP (ref 34.5–45)
HGB BLD-MCNC: 12.9 G/DL — SIGNIFICANT CHANGE UP (ref 11.5–15.5)
MCHC RBC-ENTMCNC: 32 PG — SIGNIFICANT CHANGE UP (ref 27–34)
MCHC RBC-ENTMCNC: 34.4 GM/DL — SIGNIFICANT CHANGE UP (ref 32–36)
MCV RBC AUTO: 93.1 FL — SIGNIFICANT CHANGE UP (ref 80–100)
NRBC # BLD: 0 /100 WBCS — SIGNIFICANT CHANGE UP (ref 0–0)
NRBC # FLD: 0 K/UL — SIGNIFICANT CHANGE UP (ref 0–0)
PLATELET # BLD AUTO: 307 K/UL — SIGNIFICANT CHANGE UP (ref 150–400)
POTASSIUM SERPL-MCNC: 4.1 MMOL/L — SIGNIFICANT CHANGE UP (ref 3.5–5.3)
POTASSIUM SERPL-SCNC: 4.1 MMOL/L — SIGNIFICANT CHANGE UP (ref 3.5–5.3)
PROT SERPL-MCNC: 7.4 G/DL — SIGNIFICANT CHANGE UP (ref 6–8.3)
RBC # BLD: 4.03 M/UL — SIGNIFICANT CHANGE UP (ref 3.8–5.2)
RBC # FLD: 11.8 % — SIGNIFICANT CHANGE UP (ref 10.3–14.5)
SODIUM SERPL-SCNC: 141 MMOL/L — SIGNIFICANT CHANGE UP (ref 135–145)
WBC # BLD: 6.12 K/UL — SIGNIFICANT CHANGE UP (ref 3.8–10.5)
WBC # FLD AUTO: 6.12 K/UL — SIGNIFICANT CHANGE UP (ref 3.8–10.5)

## 2022-11-23 PROCEDURE — 93010 ELECTROCARDIOGRAM REPORT: CPT

## 2022-11-23 RX ORDER — LOSARTAN POTASSIUM 100 MG/1
1 TABLET, FILM COATED ORAL
Qty: 0 | Refills: 0 | DISCHARGE

## 2022-11-23 RX ORDER — METOPROLOL TARTRATE 50 MG
1 TABLET ORAL
Qty: 0 | Refills: 0 | DISCHARGE

## 2022-11-23 NOTE — H&P PST ADULT - BREASTS COMMENTS
Pre op dx - Malignant neoplasm central portion left female breast Pre op dx - Malignant neoplasm central portion right female breast

## 2022-11-23 NOTE — H&P PST ADULT - NSICDXPASTMEDICALHX_GEN_ALL_CORE_FT
PAST MEDICAL HISTORY:  HTN (hypertension)     Malignant neoplasm of central portion of right female breast

## 2022-11-23 NOTE — H&P PST ADULT - HISTORY OF PRESENT ILLNESS
right partial mastectomy with deepthi localization , right sentinel lymph node biopsy , possible tissue transfer  67 year old female with pre op dx  of malignant  neoplasm central portion left female breast right partial mastectomy with deepthi localization , right sentinel lymph node biopsy , possible tissue transfer . 67 year old female with pre op dx  of malignant  neoplasm central portion right female breast is scheduled for  right partial mastectomy with deepthi localization , right sentinel lymph node biopsy , possible tissue transfer .

## 2022-11-23 NOTE — H&P PST ADULT - NSANTHOSAYNRD_GEN_A_CORE
No. MEDARDO screening performed.  STOP BANG Legend: 0-2 = LOW Risk; 3-4 = INTERMEDIATE Risk; 5-8 = HIGH Risk

## 2022-11-23 NOTE — H&P PST ADULT - ATTENDING COMMENTS
Right laterality confirmed and marked.  Informed consent obtained.    Proceed with scheduled surgery:   Right deepthi-localized lumpectomy with possible tissue transfer, Right sentinel lymph node biopsy.

## 2022-11-23 NOTE — H&P PST ADULT - PROBLEM SELECTOR PLAN 2
Patient instructed to take metoprolol and losartan  with a sip of water on the morning of procedure.

## 2022-11-23 NOTE — H&P PST ADULT - PROBLEM SELECTOR PLAN 1
Patient tentatively scheduled for right partial mastectomy with deepthi localization , right sentinel lymph node biopsy , possible tissue transfer   on 11/28/2022   Pre-op instructions provided. Pt given verbal and written instructions with teach back on chlorhexidine wash  and pepcid. Pt verbalized understanding with return demonstration.  Pt strongly advised  for  COVID testing requirements to be done  3- 5 days prior to procedure. Pt was provided with information for covid testing locations in written form.   Pt verbalized understanding with return demonstration Patient tentatively scheduled for right partial mastectomy with deepthi localization , right sentinel lymph node biopsy , possible tissue transfer   on 11/28/2022     Pre-op instructions provided. Pt given verbal and written instructions with teach back on chlorhexidine wash  and pepcid. Pt verbalized understanding with return demonstration.    Pt strongly advised  for  COVID testing requirements to be done  3- 5 days prior to procedure. Pt was provided with information for covid testing locations in written form.   Pt verbalized understanding with return demonstration

## 2022-11-25 ENCOUNTER — RESULT REVIEW (OUTPATIENT)
Age: 67
End: 2022-11-25

## 2022-11-25 ENCOUNTER — APPOINTMENT (OUTPATIENT)
Dept: ULTRASOUND IMAGING | Facility: CLINIC | Age: 67
End: 2022-11-25

## 2022-11-25 ENCOUNTER — OUTPATIENT (OUTPATIENT)
Dept: OUTPATIENT SERVICES | Facility: HOSPITAL | Age: 67
LOS: 1 days | End: 2022-11-25
Payer: MEDICARE

## 2022-11-25 DIAGNOSIS — Z98.51 TUBAL LIGATION STATUS: Chronic | ICD-10-CM

## 2022-11-25 DIAGNOSIS — Z00.8 ENCOUNTER FOR OTHER GENERAL EXAMINATION: ICD-10-CM

## 2022-11-25 PROBLEM — I10 ESSENTIAL (PRIMARY) HYPERTENSION: Chronic | Status: ACTIVE | Noted: 2022-11-23

## 2022-11-25 PROBLEM — C50.111 MALIGNANT NEOPLASM OF CENTRAL PORTION OF RIGHT FEMALE BREAST: Chronic | Status: ACTIVE | Noted: 2022-11-23

## 2022-11-25 PROCEDURE — 19285 PERQ DEV BREAST 1ST US IMAG: CPT

## 2022-11-25 PROCEDURE — 19285 PERQ DEV BREAST 1ST US IMAG: CPT | Mod: RT

## 2022-11-25 PROCEDURE — C1739: CPT

## 2022-11-28 ENCOUNTER — APPOINTMENT (OUTPATIENT)
Dept: NUCLEAR MEDICINE | Facility: IMAGING CENTER | Age: 67
End: 2022-11-28

## 2022-12-05 ENCOUNTER — TRANSCRIPTION ENCOUNTER (OUTPATIENT)
Age: 67
End: 2022-12-05

## 2022-12-05 VITALS
WEIGHT: 130.07 LBS | OXYGEN SATURATION: 100 % | SYSTOLIC BLOOD PRESSURE: 175 MMHG | DIASTOLIC BLOOD PRESSURE: 84 MMHG | HEART RATE: 69 BPM | RESPIRATION RATE: 19 BRPM | TEMPERATURE: 98 F | HEIGHT: 65 IN

## 2022-12-05 NOTE — ASU PREOPERATIVE ASSESSMENT, ADULT ([facility]ARK ONLY) - PROCEDURE
Right Partial Mastectomy with SaviScout Localization; Right [place] Lymph Node Biopsy; poss tissue transfer

## 2022-12-06 ENCOUNTER — APPOINTMENT (OUTPATIENT)
Dept: SURGICAL ONCOLOGY | Facility: AMBULATORY SURGERY CENTER | Age: 67
End: 2022-12-06

## 2022-12-06 ENCOUNTER — TRANSCRIPTION ENCOUNTER (OUTPATIENT)
Age: 67
End: 2022-12-06

## 2022-12-06 ENCOUNTER — RESULT REVIEW (OUTPATIENT)
Age: 67
End: 2022-12-06

## 2022-12-06 ENCOUNTER — APPOINTMENT (OUTPATIENT)
Dept: NUCLEAR MEDICINE | Facility: IMAGING CENTER | Age: 67
End: 2022-12-06

## 2022-12-06 ENCOUNTER — APPOINTMENT (OUTPATIENT)
Dept: MAMMOGRAPHY | Facility: IMAGING CENTER | Age: 67
End: 2022-12-06

## 2022-12-06 ENCOUNTER — OUTPATIENT (OUTPATIENT)
Dept: OUTPATIENT SERVICES | Facility: HOSPITAL | Age: 67
LOS: 1 days | Discharge: HOME | End: 2022-12-06

## 2022-12-06 ENCOUNTER — OUTPATIENT (OUTPATIENT)
Dept: OUTPATIENT SERVICES | Facility: HOSPITAL | Age: 67
LOS: 1 days | End: 2022-12-06
Payer: COMMERCIAL

## 2022-12-06 VITALS
SYSTOLIC BLOOD PRESSURE: 138 MMHG | OXYGEN SATURATION: 98 % | RESPIRATION RATE: 14 BRPM | HEART RATE: 73 BPM | TEMPERATURE: 98 F | DIASTOLIC BLOOD PRESSURE: 69 MMHG

## 2022-12-06 DIAGNOSIS — C50.112 MALIGNANT NEOPLASM OF CENTRAL PORTION OF LEFT FEMALE BREAST: ICD-10-CM

## 2022-12-06 DIAGNOSIS — Z00.8 ENCOUNTER FOR OTHER GENERAL EXAMINATION: ICD-10-CM

## 2022-12-06 DIAGNOSIS — Z98.51 TUBAL LIGATION STATUS: Chronic | ICD-10-CM

## 2022-12-06 PROCEDURE — 88341 IMHCHEM/IMCYTCHM EA ADD ANTB: CPT | Mod: 26

## 2022-12-06 PROCEDURE — 38525 BIOPSY/REMOVAL LYMPH NODES: CPT | Mod: RT

## 2022-12-06 PROCEDURE — 88342 IMHCHEM/IMCYTCHM 1ST ANTB: CPT | Mod: 26

## 2022-12-06 PROCEDURE — 76098 X-RAY EXAM SURGICAL SPECIMEN: CPT

## 2022-12-06 PROCEDURE — 38900 IO MAP OF SENT LYMPH NODE: CPT | Mod: RT

## 2022-12-06 PROCEDURE — 88305 TISSUE EXAM BY PATHOLOGIST: CPT | Mod: 26

## 2022-12-06 PROCEDURE — 82360 CALCULUS ASSAY QUANT: CPT

## 2022-12-06 PROCEDURE — 76098 X-RAY EXAM SURGICAL SPECIMEN: CPT | Mod: 26

## 2022-12-06 PROCEDURE — 14301 TIS TRNFR ANY 30.1-60 SQ CM: CPT

## 2022-12-06 PROCEDURE — 88307 TISSUE EXAM BY PATHOLOGIST: CPT | Mod: 26

## 2022-12-06 PROCEDURE — 19301 PARTIAL MASTECTOMY: CPT | Mod: RT

## 2022-12-06 DEVICE — CLIP APPLIER ETHICON LIGACLIP 11.5" MEDIUM: Type: IMPLANTABLE DEVICE | Site: RIGHT | Status: FUNCTIONAL

## 2022-12-06 RX ORDER — SODIUM CHLORIDE 9 MG/ML
1000 INJECTION, SOLUTION INTRAVENOUS
Refills: 0 | Status: DISCONTINUED | OUTPATIENT
Start: 2022-12-06 | End: 2022-12-20

## 2022-12-06 NOTE — BRIEF OPERATIVE NOTE - NSICDXBRIEFPROCEDURE_GEN_ALL_CORE_FT
PROCEDURES:  Partial mastectomy 06-Dec-2022 16:26:07  Mirian Burleson  [place] node mapping with biopsy 06-Dec-2022 16:26:46  Mirian Burleson   PROCEDURES:  Partial mastectomy 06-Dec-2022 16:26:07  Mirian Burleson  [place] node mapping with biopsy 06-Dec-2022 16:26:46  Mirian Burleson  Adjacent tissue transfer involving complex defect 06-Dec-2022 16:33:28  Mirian Burleson   PROCEDURES:  Partial mastectomy 06-Dec-2022 16:26:07  Mirian Burleson  [place] node mapping with biopsy 06-Dec-2022 16:26:46  Mirian Burleson  Adjacent tissue transfer, greater than 30 sq cm 06-Dec-2022 16:38:49  Mirian Burleson   PROCEDURES:  Partial mastectomy 06-Dec-2022 16:26:07 With Vera  localization Mirian Burleson  [place] node mapping with biopsy 06-Dec-2022 16:26:46  Mirian Burleson  Adjacent tissue transfer, greater than 30 sq cm 06-Dec-2022 16:38:49  Mirian Burleson

## 2022-12-06 NOTE — ASU DISCHARGE PLAN (ADULT/PEDIATRIC) - PROCEDURE
Right partial mastectomy with Vera  localization , Right [place] Lymph node biopsy Right partial mastectomy with Vera  localization , Right [place] Lymph node biopsy

## 2022-12-06 NOTE — ASU PREOP CHECKLIST - ALLERGIES REVIEWED
Pt fall protocol  Yellow arm band on patient, yellow non skid socks on   bed in low position, all side rails up, call bell in reach  Pt  & family instructed in \"call don't fall\" protocol     Use your call bell,and wait for assistance, staff not family will assist you to get up &   move about  Pt & family verbalize understanding of fall precautions and \"call don't fall\" protocol done

## 2022-12-06 NOTE — BRIEF OPERATIVE NOTE - SPECIMENS
Right breast Mass, 2 Right axillary sentinel Lymph nodes Right breast Mass, 2 Right axillary sentinel Lymph nodes, Shave margins (SLIMP)

## 2022-12-06 NOTE — BRIEF OPERATIVE NOTE - OPERATION/FINDINGS
Right breast mass localized with Vera  marked as single short superior, long lateral and one short Anterior. 2 [place] Lymph nodes identified and removed ( hot and blue) Right breast mass localized with Vera  marked as single short superior, long lateral and one short Anterior. Tissue transfer done.  2 [place] Lymph nodes identified and removed ( hot and blue)

## 2022-12-06 NOTE — ASU PREOP CHECKLIST - MEDICAL/PEDIATRIC CLEARANCE ON MEDICAL RECORD
Mom (Ambar) dropped off forms for Dr. Soto to fill out so patient (Justo) can attend baseball camp this summer. Reception dropped off in Peds. Please call Mom when forms are completed. [phone] (H)    PST Evaluation

## 2022-12-06 NOTE — BRIEF OPERATIVE NOTE - NSICDXBRIEFPOSTOP_GEN_ALL_CORE_FT
POST-OP DIAGNOSIS:  Invasive ductal carcinoma of right breast 06-Dec-2022 16:28:00  Mirian Burleson

## 2022-12-06 NOTE — BRIEF OPERATIVE NOTE - NSICDXBRIEFPREOP_GEN_ALL_CORE_FT
PRE-OP DIAGNOSIS:  Invasive ductal carcinoma of right breast 06-Dec-2022 16:27:20  Mirian Burleson

## 2022-12-06 NOTE — ASU DISCHARGE PLAN (ADULT/PEDIATRIC) - NS MD DC FALL RISK RISK
For information on Fall & Injury Prevention, visit: [email or web address] OR  [email or web address] OR  https://www.cdc.gov/steadi/patient.html

## 2022-12-06 NOTE — ASU DISCHARGE PLAN (ADULT/PEDIATRIC) - ASU DC SPECIAL INSTRUCTIONSFT
Your Urine may have a blue tingle following the surgery, this is expected and will clear on its own.

## 2022-12-06 NOTE — ASU DISCHARGE PLAN (ADULT/PEDIATRIC) - CARE PROVIDER_API CALL
Reyes, Sylvia A (MD)  Surgery  [address] [place] [address]  Phone: [phone]  Fax: [phone]  Follow Up Time: 2 weeks

## 2022-12-14 LAB — SURGICAL PATHOLOGY STUDY: SIGNIFICANT CHANGE UP

## 2022-12-15 ENCOUNTER — APPOINTMENT (OUTPATIENT)
Dept: SURGICAL ONCOLOGY | Facility: CLINIC | Age: 67
End: 2022-12-15

## 2022-12-15 VITALS
DIASTOLIC BLOOD PRESSURE: 76 MMHG | HEIGHT: 65 IN | WEIGHT: 130 LBS | RESPIRATION RATE: 16 BRPM | OXYGEN SATURATION: 97 % | BODY MASS INDEX: 21.66 KG/M2 | SYSTOLIC BLOOD PRESSURE: 182 MMHG | HEART RATE: 74 BPM

## 2022-12-15 PROCEDURE — 99024 POSTOP FOLLOW-UP VISIT: CPT

## 2022-12-15 NOTE — PHYSICAL EXAM
[Normal] : full range of motion and no deformities appreciated [de-identified] : Right breast incision is c/d/i with no infection.   There is some yellowing/bruising that is resolving.  The right axilla has a palpable seroma with no erythema and no drainage, axillary dressing is intact and incision is c/d/i.

## 2022-12-15 NOTE — HISTORY OF PRESENT ILLNESS
[FreeTextEntry1] : 66 y/o F who was recently dx with Invasive mammary carcinoma Right 12:00-retroareolar, cT1 N0, +/+/-. \par Initially found on screening mammogram now s/p Right Partial mastectomy w/ deepthi-loc, R SLNBx (0/4), tissue transfer on 12/6/22. \par Fam hx: MAunt breast cancer 53, MGM ovarian cancer 88 - Genetic panel Negative \par  \par \par Patient Presents for post-operative visit. No complaints in right breast, pain controlled. Right breast surgical site is C/D/I, with no signs of infection. She c/o discomfort and fullness in the right axilla.    Denies fevers or chills.

## 2022-12-15 NOTE — ASSESSMENT
[FreeTextEntry1] : 68 y/o F who was recently dx with Invasive mammary carcinoma Right 12:00-retroareolar, cT1 N0, +/+/-. \par Initially found on screening mammogram now s/p Right Partial mastectomy w/ deepthi scot loc, R SLNBX, tissue transfer on 12/6/22. , pT1a pN0, Stage IA\par \par \par We discussed her Surgical pathology results with confirmed Right Breast Cancer (Infiltrating lobular carcinoma) to be  2 mm in largest dimension. No Lymph node involvement 0/4.   We discussed the LCIS at the superior margin and that this is considered a high risk lesion for which MRI screening is recommended.   No further surgery is necessary.  \par -Right axillary seroma was aspirated under ultrasound guidance and sterile technique with a total of 30cc serous fluid extracted.     Post-procedure ultrasound confirmed resolution of right axillary seroma.   \par \par - Referred to Rad/onc (Dr. France/Dr. Stone) asap. \par - Referred to Medical oncology (Dr. Jeronimo Merlos) within 6 weeks.   No OncotypeDx sent given small size of invasive tumor.  \par - MRI in 6 months. \par - Return to office in 3 months for follow up \par \par  Vaccine Information Sheet (VIS) provided - VIS Date: 5/9/13

## 2022-12-15 NOTE — DATA REVIEWED
[FreeTextEntry1] : Breast rad/path review: \par 7/10/20 BL Sc MG/US - birads2 - heterogenously dense\par 10/27/21 BL Sc MG/US - birads 2 - scattered density\par 11/2/22 BL Sc MG/US - birads0 - scattered density, Right 12:00RA 4mm mass. Left breast normal.\par 11/11/22 R Dx MG/US - birads4 - Right 12:00RA 5mm angular hypoechoic mass. Normal right axilla.\par \par 12/6/22 s/p Right Vera localized Partial mastectomy, Right [place] lymph node biopsy, tissue transfer\par \par \par \par

## 2022-12-15 NOTE — REASON FOR VISIT
[de-identified] : Right Partial mastectomy w/ deepthi scot loc, R SLNBX, tissue transfer [de-identified] : 12/6/2022 [de-identified] : 9

## 2022-12-20 ENCOUNTER — APPOINTMENT (OUTPATIENT)
Dept: SURGICAL ONCOLOGY | Facility: CLINIC | Age: 67
End: 2022-12-20

## 2022-12-20 VITALS
HEART RATE: 69 BPM | RESPIRATION RATE: 16 BRPM | WEIGHT: 130 LBS | HEIGHT: 65 IN | BODY MASS INDEX: 21.66 KG/M2 | DIASTOLIC BLOOD PRESSURE: 97 MMHG | SYSTOLIC BLOOD PRESSURE: 188 MMHG | OXYGEN SATURATION: 98 %

## 2022-12-20 PROCEDURE — 99024 POSTOP FOLLOW-UP VISIT: CPT

## 2022-12-20 NOTE — HISTORY OF PRESENT ILLNESS
[de-identified] : 68 y/o F who was recently dx with Invasive mammary carcinoma Right 12:00-retroareolar, cT1 N0, +/+/-. \par Initially found on screening mammogram now s/p Right Partial mastectomy w/ deepthi-loc, R SLNBx (0/4), tissue transfer on 12/6/22. \par Fam hx: MAunt breast cancer 53, MGM ovarian cancer 88 - Genetic panel Negative \par  \par Patient presents to office c/o continued Right axillary discomfort and feels "Fluid in R Axilla reaccumulated" since last bedside aspiration on 12/15/22. Denies fevers, chills, erythema or discharge over surgical site. \par

## 2022-12-20 NOTE — PHYSICAL EXAM
[Normal] : full range of motion and no deformities appreciated [de-identified] : Right breast incision is c/d/i with no infection.   There is some yellowing/bruising that is resolving.  The right axilla has a palpable seroma (smaller than prior) with no erythema and no drainage, axillary incision is c/d/i.

## 2022-12-20 NOTE — RESULTS/DATA
[FreeTextEntry1] : Breast rad/path review: \par 7/10/20 BL Sc MG/US - birads2 - heterogenously dense\par 10/27/21 BL Sc MG/US - birads 2 - scattered density\par 11/2/22 BL Sc MG/US - birads0 - scattered density, Right 12:00RA 4mm mass. Left breast normal.\par 11/11/22 R Dx MG/US - birads4 - Right 12:00RA 5mm angular hypoechoic mass. Normal right axilla.\par \par 12/6/22 s/p Right Vera localized Partial mastectomy, Right [place] lymph node biopsy, tissue transfer\par 1. Right breast, 12:00 mass, partial mastectomy\par - Infiltrating lobular carcinoma, classic type, measuring 2 mm in greatest dimension.\par 2. Right sentinel lymph node #1\par - Three benign lymph nodes.\par 3. Right sentinel lymph node #2\par - One benign lymph node.\par 4. Right breast superior margin\par - Lobular carcinoma in situ, classic type.\par 5-8. Right breast lateral, inferior, medial, posterior margin\par - Benign breast tissue.\par

## 2022-12-20 NOTE — ASSESSMENT
[FreeTextEntry1] : 68 y/o F who was recently dx with Invasive mammary carcinoma Right 12:00-retroareolar, cT1 N0, +/+/-. \par Initially found on screening mammogram now s/p Right Partial mastectomy w/ deepthi scot loc, R SLNBX, tissue transfer on 12/6/22. \par \par Surgical Path: Confirmed Right Breast Cancer (Infiltrating lobular carcinoma) to be 2 mm in largest dimension. No Lymph node involvement 0/4. Discussed the LCIS at the superior margin\par pT1a pN0, Stage IA\par \par Right postop axillary seroma - aspirated 20cc serous fluid under ultrasound guidance.  compression dressing applied.  \par \par - Referred to Rad/onc (Dr. France/Dr. Stone)  \par - Referred to Medical oncology (Dr. Jeronimo Merlos) within 6 weeks. No OncotypeDx sent given small size of invasive tumor. \par - MRI in 6 months ( 6/23)  \par - Return to office in 3 months for follow up (3/2023) or sooner if any breast/axillary concerns. \par \par

## 2023-01-04 ENCOUNTER — NON-APPOINTMENT (OUTPATIENT)
Age: 68
End: 2023-01-04

## 2023-01-05 ENCOUNTER — OUTPATIENT (OUTPATIENT)
Dept: OUTPATIENT SERVICES | Facility: HOSPITAL | Age: 68
LOS: 1 days | Discharge: HOME | End: 2023-01-05

## 2023-01-05 ENCOUNTER — APPOINTMENT (OUTPATIENT)
Dept: SURGICAL ONCOLOGY | Facility: CLINIC | Age: 68
End: 2023-01-05
Payer: MEDICARE

## 2023-01-05 VITALS
HEIGHT: 65 IN | BODY MASS INDEX: 21.16 KG/M2 | SYSTOLIC BLOOD PRESSURE: 145 MMHG | OXYGEN SATURATION: 99 % | HEART RATE: 71 BPM | WEIGHT: 127 LBS | RESPIRATION RATE: 15 BRPM | DIASTOLIC BLOOD PRESSURE: 77 MMHG

## 2023-01-05 DIAGNOSIS — Z98.51 TUBAL LIGATION STATUS: Chronic | ICD-10-CM

## 2023-01-05 DIAGNOSIS — Z12.31 ENCOUNTER FOR SCREENING MAMMOGRAM FOR MALIGNANT NEOPLASM OF BREAST: ICD-10-CM

## 2023-01-05 PROCEDURE — 99024 POSTOP FOLLOW-UP VISIT: CPT

## 2023-01-09 RX ORDER — VALACYCLOVIR 500 MG/1
500 TABLET, FILM COATED ORAL TWICE DAILY
Qty: 60 | Refills: 0 | Status: DISCONTINUED | COMMUNITY
Start: 2022-08-26 | End: 2023-01-09

## 2023-01-11 ENCOUNTER — APPOINTMENT (OUTPATIENT)
Dept: HEMATOLOGY ONCOLOGY | Facility: CLINIC | Age: 68
End: 2023-01-11
Payer: MEDICARE

## 2023-01-11 ENCOUNTER — NON-APPOINTMENT (OUTPATIENT)
Age: 68
End: 2023-01-11

## 2023-01-11 ENCOUNTER — APPOINTMENT (OUTPATIENT)
Dept: RADIATION ONCOLOGY | Facility: CLINIC | Age: 68
End: 2023-01-11
Payer: MEDICARE

## 2023-01-11 ENCOUNTER — OUTPATIENT (OUTPATIENT)
Dept: OUTPATIENT SERVICES | Facility: HOSPITAL | Age: 68
LOS: 1 days | Discharge: HOME | End: 2023-01-11
Payer: MEDICARE

## 2023-01-11 VITALS
HEART RATE: 70 BPM | WEIGHT: 128.53 LBS | BODY MASS INDEX: 21.41 KG/M2 | HEIGHT: 65 IN | DIASTOLIC BLOOD PRESSURE: 79 MMHG | SYSTOLIC BLOOD PRESSURE: 151 MMHG | OXYGEN SATURATION: 99 % | RESPIRATION RATE: 16 BRPM | TEMPERATURE: 97.3 F

## 2023-01-11 VITALS
OXYGEN SATURATION: 100 % | HEART RATE: 59 BPM | HEIGHT: 65 IN | BODY MASS INDEX: 21.45 KG/M2 | TEMPERATURE: 97.7 F | RESPIRATION RATE: 17 BRPM | DIASTOLIC BLOOD PRESSURE: 83 MMHG | WEIGHT: 128.75 LBS | SYSTOLIC BLOOD PRESSURE: 173 MMHG

## 2023-01-11 DIAGNOSIS — Z98.51 TUBAL LIGATION STATUS: Chronic | ICD-10-CM

## 2023-01-11 PROCEDURE — 99204 OFFICE O/P NEW MOD 45 MIN: CPT | Mod: 25

## 2023-01-11 PROCEDURE — 99205 OFFICE O/P NEW HI 60 MIN: CPT

## 2023-01-11 PROCEDURE — 77263 THER RADIOLOGY TX PLNG CPLX: CPT

## 2023-01-11 RX ORDER — MULTIVITAMIN
TABLET ORAL DAILY
Refills: 0 | Status: ACTIVE | COMMUNITY
Start: 2023-01-11

## 2023-01-11 NOTE — HISTORY OF PRESENT ILLNESS
[de-identified] : Age 67: right breast cancer\par Screen detected: she had her interval breast imaging done on 10/28/22 which showed asymmetry in the low right axilla on mammography. Further evaluation with right MLO spot compression view with tomosynthesis and possible targeted right breast ultrasound was recommended. Newly visualized small hypoechoic mass in the right breast 12:00 retroareolar location on sonography. Additional imaging done on 11/2022 showed indeterminate hypoechoic mass in the right breast at 12:00, retroareolar for which ultrasound-guided core needle biopsy is recommended. She underwent on 11/11/22 - underwent right breast 12:00 retroareolar biopsy which showed invasive moderately differentiated mammary Carcinoma (measures at least 5 mm), Pierz score 6/9, LCIS classic type. Lymphovascular permeation by tumor not seen. ER + (99%) TN + (30%) HER2 negative. On 12/6/22, she underwent RIGHT Lumpectomy, RIGHT SLNB, and tissue transfer with Dr. S. Reyes. The pathology showed infiltrating lobular carcinoma, classic type, measuring 2 mm in greatest dimension. Dennise score 6/9. LCIS, classic type. Four sentinel lymph nodes negative (0/4). Right breast superior margin with LCIS, classic type. \par  [de-identified] :  infiltrating lobular carcinoma ER + (99%) MD + (30%) HER2 negative [de-identified] : Invitaoscar 47 gene panel 11/2022: no actionable mutations  [de-identified] : She is present for adjuvant recommendations. She saw Dr France for radiation evaluation this morning. Last bone density done 10/2021 which showed osteopenia in the femoral neck: -1.3. She has occasional hot flashes but is not intolerable. She denies any baseline arthritis or GI upset.

## 2023-01-11 NOTE — CONSULT LETTER
[Dear  ___] : Dear  [unfilled], [Consult Letter:] : I had the pleasure of evaluating your patient, [unfilled]. [( Thank you for referring [unfilled] for consultation for _____ )] : Thank you for referring [unfilled] for consultation for [unfilled] [Please see my note below.] : Please see my note below. [Consult Closing:] : Thank you very much for allowing me to participate in the care of this patient.  If you have any questions, please do not hesitate to contact me. [Sincerely,] : Sincerely, [FreeTextEntry2] : Sylvia Reyes MD\par [address] [place] [address] \[place] [address] [FreeTextEntry3] : Jeronimo Merlos MD\par Attending\par [facility]\par  [DrMyra  ___] : Dr. MENDEZ

## 2023-01-11 NOTE — ASSESSMENT
[FreeTextEntry1] : She is a 68 y/o F with right Stage I invasive lobular carcinoma ER + (99%) AK + (30%) HER2 negative s/p lumpectomy and SLNB. We reviewed the significance of ER positive, LN negative breast cancer and the risk of breast cancer recurrence. We reviewed the role of adjuvant therapy to reduce the risk of breast cancer recurrence. We reviewed with tumors 5 mm or less, the risks of chemotherapy may outweigh any benefits. We recommended endocrine therapy. We reviewed the different endocrine options. We reviewed anastrozole one tablet a day for up to 5 to 10 years. We reviewed the potential side effects of endocrine therapy including but not limited to: hot flash, GI upset, bone stiffness/ arthralgias, fatigue, and decrease in bone density. We reviewed supportive measures to decrease these side effects. We reviewed bone health with calcium and Vitamin D supplementation: will have interval bone density done in the fall 2023. Written information on anastrozole was provided to her. Questions were answered to her satisfaction. She understands if she experiences any intolerable side effects, we could switch endocrine therapies. She consented to anastrozole. Her next follow up will be in 3 months.\par

## 2023-01-11 NOTE — REVIEW OF SYSTEMS
[Hot Flashes] : hot flashes [Proptosis] : no proptosis [Muscle Weakness] : no muscle weakness [Deepening Of The Voice] : no deepening of the voice

## 2023-01-13 ENCOUNTER — NON-APPOINTMENT (OUTPATIENT)
Age: 68
End: 2023-01-13

## 2023-01-13 PROCEDURE — 77333 RADIATION TREATMENT AID(S): CPT | Mod: 26

## 2023-01-13 PROCEDURE — 77290 THER RAD SIMULAJ FIELD CPLX: CPT | Mod: 26

## 2023-01-16 NOTE — PHYSICAL EXAM
[Sclera] : the sclera and conjunctiva were normal [Outer Ear] : the ears and nose were normal in appearance [No UE Edema] : there is no upper extremity edema [Normal] : oriented to person, place and time, the affect was normal, the mood was normal and not anxious [de-identified] : right breast / axilla scar healing well

## 2023-01-16 NOTE — VITALS
[Maximal Pain Intensity: 2/10] : 2/10 [Least Pain Intensity: 0/10] : 0/10 [Pain Description/Quality: ___] : Pain description/quality: [unfilled] [Pain Duration: ___] : Pain duration: [unfilled] [Pain Location: ___] : Pain Location: [unfilled] [NoTreatment Scheduled] : no treatment scheduled [90: Able to carry normal activity; minor signs or symptoms of disease.] : 90: Able to carry normal activity; minor signs or symptoms of disease.  [ECOG Performance Status: 1 - Restricted in physically strenuous activity but ambulatory and able to carry out work of a light or sedentary nature] : Performance Status: 1 - Restricted in physically strenuous activity but ambulatory and able to carry out work of a light or sedentary nature, e.g., light house work, office work [Date: ____________] : Patient's last distress assessment performed on [unfilled]. [2 - Distress Level] : Distress Level: 2 [Pain Interferes with ADLs] : Pain does not interfere with activities of daily living

## 2023-01-16 NOTE — HISTORY OF PRESENT ILLNESS
[FreeTextEntry1] : Ms. Goff is a 67 year old female who presents in consultation for consideration of radiation therapy. Seen with her  today.\par \par Diagnosis:  pT1a pN0 RIGHT Infiltrating Lobular Carcinoma, classic type (5 mm).  LCIS, classic type. Four sentinel lymph nodes negative (0/4).  Right breast superior margin with LCIS, classic type.   ER + (99%) ME + (30%)  HER2 -  \par \par HPI : \par \par 10/28/22 - Bilateral Screening Mammogram/US:  Asymmetry in the low right axilla on mammography. Further evaluation with right MLO spot compression view with tomosynthesis and possible targeted right breast ultrasound is recommended.  Newly visualized small hypoechoic mass in the right breast 12:00 retroareolar location on sonography. Further evaluation with physician directed targeted right breast ultrasound is recommended.  No mammographic or sonographic evidence of malignancy in the left breast.  BIRADS 0 \par \par 11/4/22 - RIGHT Diagnostic Mammogram/US:  Indeterminate hypoechoic mass in the right breast at 12:00, retroareolar for which ultrasound-guided core needle biopsy is recommended.  Superior right breast/axilla asymmetry corresponding with overlapping fibroglandular tissue.  BIRADS 4B \par \par 11/11/22 - underwent RIGHT Breast, 12:00 retroareolar Biopsy:  Pathology -  Invasive moderately differentiated Mammary Carcinoma (measures at least 5 mm), [place] score 6/9, LCIS classic type.  Lymphovascular permeation by tumor not seen.  \par ER + (99%) ME + (30%)  HER2 -  \par \par 12/6/22 - underwent RIGHT Lumpectomy, RIGHT SLNB, and tissue transfer (Dr. S. Reyes - surgeon):  Pathology -  Infiltrating Lobular Carcinoma, classic type, measuring 2 mm in greatest dimension.  [place] score 6/9.  LCIS, classic type. Four sentinel lymph nodes negative (0/4).  Right breast superior margin with LCIS, classic type.  Comment:  Immunohistochemical stain for E-cadherin performed on specimen #1D is negative in the neoplastic cells. Immunohistochemical stains for cytokeratin cocktail performed on specimen  #2A, #2B, #2C and #3A are negative.\par \par 12/15/22 - seen in follow up by Dr. Reyes (surgeon) .. no Oncotype DX sent due to small size of invasive tumor.  Right axillary seroma aspiration done (30cc serous fluid).  Referrals to medical and radiation oncology made.  Breast MRI to be done in 6 months. \par Had re-aspiration of fluid done on 12/20/22 (20cc).  \par \par 1/11/23 - Ms. Goff presents in consultation for discussion of radiation options.\par To see Dr. Merlos ([facility]) today as well.  \par She is doing well today. She states slight discomfort in her right axilla but denies any breast pain post lumpectomy. She denies any changes in bowel habits, nipple discharge or discoloration. Skin care education provided. BP was high 173/83 repeat BP was 169/80, patient states that she tends to run high, will follow up with PCP.

## 2023-01-16 NOTE — REVIEW OF SYSTEMS
[Palpitations] : palpitations [Hot Flashes] : hot flashes [Negative] : Allergic/Immunologic [Chest Pain] : no chest pain [Leg Claudication] : no intermittent leg claudication [Lower Ext Edema] : no lower extremity edema [Proptosis] : no proptosis [Muscle Weakness] : no muscle weakness [Deepening Of The Voice] : no deepening of the voice [FreeTextEntry5] : S

## 2023-01-20 PROCEDURE — 77295 3-D RADIOTHERAPY PLAN: CPT | Mod: 26

## 2023-01-20 PROCEDURE — 77300 RADIATION THERAPY DOSE PLAN: CPT | Mod: 26

## 2023-01-20 PROCEDURE — 77334 RADIATION TREATMENT AID(S): CPT | Mod: 26

## 2023-01-24 PROCEDURE — 77280 THER RAD SIMULAJ FIELD SMPL: CPT | Mod: 26

## 2023-01-25 PROCEDURE — 77427 RADIATION TX MANAGEMENT X5: CPT

## 2023-01-25 NOTE — PHYSICAL EXAM
[Normal] : full range of motion and no deformities appreciated [de-identified] : Right breast incision is c/d/i with no infection.   The previous ecchymosis has resolved.  The right axilla has a mildly palpable seroma (smaller than prior) with no erythema and no drainage, axillary incision is c/d/i.    No evidence of infection.  Overall improved from prior.  [de-identified] : Normal respiratory effort

## 2023-01-25 NOTE — ASSESSMENT
[FreeTextEntry1] : 68 y/o F  Invasive mammary carcinoma Right 12:00-retroareolar, cT1 N0, +/+/-. \par Initially found on screening mammogram now s/p Right Partial mastectomy w/ deepthi scot loc, R SLNBX, tissue transfer on 12/6/22.  [id number] ILC\par \par Surgical Path: Confirmed Right Breast Cancer (Infiltrating lobular carcinoma) to be 2 mm in largest dimension. No Lymph nodes 0/4. Discussed the LCIS at the superior margin and referred for radiation evaluation.  Margins negative for cancer. \par pT1a pN0, Stage IA\par \par Bedside US done today in-office showing small Right axillary seroma, less then prior. No signs of infection on physical exam. Patient instructed to continue right breast massage, NSAIDs for inflammation. \par \par - Patient scheduled to see Rad/oncology (Dr. France) on 1/11/23 \par - Patient scheduled to see  Medical oncology (Dr. Jeronimo Merlos) on 1/11/23. No OncotypeDx sent given small size of invasive tumor. \par - MRI in 6 months ( 6/23) \par - Return to office in 3 months for follow up (3/6/2023) or sooner if any breast/axillary concerns. \par

## 2023-01-25 NOTE — HISTORY OF PRESENT ILLNESS
[de-identified] : 66 y/o F with Right Invasive mammary carcinoma (Dx 11/2022)  Right 12:00-retroareolar, cT1 N0, +/+/-. \par Initially found on screening mammogram now s/p Right Partial mastectomy w/ deepthi-loc, R SLNBx (0/4), tissue transfer on 12/6/22.\par Fam hx: MAunt breast cancer 53, MGM ovarian cancer 89 \par Genetic panel Negative \par \par  Patient presents to office c/o continued Right axillary discomfort/swelling since last bedside seroma aspiration on 12/15/22 and 12/20/22 (aspirated 20cc serous fluid under US guidance) . Denies fevers, chills, erythema or discharge over surgical site. States swelling  is less then prior with noted axillary discomfort after breast massage. Patient has full ROM.

## 2023-01-25 NOTE — RESULTS/DATA
[FreeTextEntry1] : \par Breast rad/path review: \par 7/10/20 BL Sc MG/US - birads2 - heterogenously dense\par 10/27/21 BL Sc MG/US - birads 2 - scattered density\par 11/2/22 BL Sc MG/US - birads0 - scattered density, Right 12:00RA 4mm mass. Left breast normal.\par 11/11/22 R Dx MG/US - birads4 - Right 12:00RA 5mm angular hypoechoic mass. Normal right axilla.\par \par 12/6/22 s/p Right Vera localized Partial mastectomy, Right Prim lymph node biopsy, tissue transfer\par 1. Right breast, 12:00 mass, partial mastectomy\par - Infiltrating lobular carcinoma, classic type, measuring 2 mm in greatest dimension.\par 2. Right sentinel lymph node #1\par - Three benign lymph nodes.\par 3. Right sentinel lymph node #2\par - One benign lymph node.\par 4. Right breast superior margin\par - Lobular carcinoma in situ, classic type.\par 5-8. Right breast lateral, inferior, medial, posterior margin\par - Benign breast tissue.\par

## 2023-01-25 NOTE — REASON FOR VISIT
[Post-Op] : a post-op for [FreeTextEntry2] : Continued Right Axillary discomfort/fullness s/p lumpectomy, SLNBx

## 2023-01-27 ENCOUNTER — NON-APPOINTMENT (OUTPATIENT)
Age: 68
End: 2023-01-27

## 2023-01-27 DIAGNOSIS — Z92.3 PERSONAL HISTORY OF IRRADIATION: ICD-10-CM

## 2023-01-27 NOTE — REVIEW OF SYSTEMS
[Edema Limbs: Grade 0] : Edema Limbs: Grade 0  [Fatigue: Grade 0] : Fatigue: Grade 0 [Localized Edema: Grade 0] : Localized Edema: Grade 0  [Breast Pain: Grade 0] : Breast Pain: Grade 0 [Pruritus: Grade 0] : Pruritus: Grade 0 [Skin Atrophy: Grade 0] : Skin Atrophy: Grade 0 [Skin Hyperpigmentation: Grade 0] : Skin Hyperpigmentation: Grade 0 [Skin Induration: Grade 0] : Skin Induration: Grade 0 [Dermatitis Radiation: Grade 1 - Faint erythema or dry desquamation] : Dermatitis Radiation: Grade 1 - Faint erythema or dry desquamation [FreeTextEntry6] : very faint

## 2023-01-27 NOTE — DISEASE MANAGEMENT
[Pathological] : TNM Stage: p [IA] : IA [TTNM] : 1a [NTNM] : 0 [MTNM] : 0 [de-identified] : [id number] [de-identified] : [id number] [de-identified] : RIGHT Breast

## 2023-01-27 NOTE — HISTORY OF PRESENT ILLNESS
[FreeTextEntry1] : Ms. Goff is a 67 year old female who presents for an on treatment visit.  She is unaccompanied for today's visit.  \par \par Diagnosis:  pT1a pN0 RIGHT Infiltrating Lobular Carcinoma, classic type (5 mm).  LCIS, classic type. Four sentinel lymph nodes negative (0/4).  Right breast superior margin with LCIS, classic type.   ER + (99%) IN + (30%)  HER2 -  \par \par Oncologic Management:\par \par 11/11/22 - underwent RIGHT Breast, 12:00 retroareolar Biopsy:  Pathology -  Invasive moderately differentiated Mammary Carcinoma (measures at least 5 mm), [place] score 6/9, LCIS classic type.  Lymphovascular permeation by tumor not seen.  \par ER + (99%) IN + (30%)  HER2 -  \par \par 12/6/22 - underwent RIGHT Lumpectomy, RIGHT SLNB, and tissue transfer (Dr. S. Reyes - surgeon):  Pathology -  Infiltrating Lobular Carcinoma, classic type, measuring 2 mm in greatest dimension.  [place] score 6/9.  LCIS, classic type. Four sentinel lymph nodes negative (0/4).  Right breast superior margin with LCIS, classic type.  Comment:  Immunohistochemical stain for E-cadherin performed on specimen #1D is negative in the neoplastic cells. Immunohistochemical stains for cytokeratin cocktail performed on specimen  #2A, #2B, #2C and #3A are negative.\par \par 12/15/22 - seen in follow up by Dr. Reyes (surgeon) .. no Oncotype DX sent due to small size of invasive tumor.  Right axillary seroma aspiration done (30cc serous fluid).  Referrals to medical and radiation oncology made.  Breast MRI to be done in 6 months. \par Had re-aspiration of fluid done on 12/20/22 (20cc).  \par \par 1/11/23 -  saw Dr. Merlos ([facility]) in consultation ... recommended endocrine therapy after completion of radiation therapy.  \par \par 1/25/23 - started on RADIATION Therapy to RIGHT Breast, 4240 cGy in 16 fx \par \par 1/27/23 - OTV 3/16 fx completed.  Ms. Goff is doing well so far with radiation.  She has noticed that her skin is slightly pink, no warmth to area.  Some very faint pinkness noted on skin on lower part of the breast.  She is moisturizing at least twice daily with Miaderm and Aquaphor.

## 2023-02-01 PROCEDURE — 77427 RADIATION TX MANAGEMENT X5: CPT

## 2023-02-06 ENCOUNTER — NON-APPOINTMENT (OUTPATIENT)
Age: 68
End: 2023-02-06

## 2023-02-06 NOTE — REVIEW OF SYSTEMS
[Edema Limbs: Grade 0] : Edema Limbs: Grade 0  [Localized Edema: Grade 0] : Localized Edema: Grade 0  [Breast Pain: Grade 0] : Breast Pain: Grade 0 [Pruritus: Grade 0] : Pruritus: Grade 0 [Skin Atrophy: Grade 0] : Skin Atrophy: Grade 0 [Skin Induration: Grade 0] : Skin Induration: Grade 0 [Dermatitis Radiation: Grade 1 - Faint erythema or dry desquamation] : Dermatitis Radiation: Grade 1 - Faint erythema or dry desquamation [Fatigue: Grade 1 - Fatigue relieved by rest] : Fatigue: Grade 1 - Fatigue relieved by rest [Skin Hyperpigmentation: Grade 1 - Hyperpigmentation covering <10% BSA; no psychosocial impact] : Skin Hyperpigmentation: Grade 1 - Hyperpigmentation covering <10% BSA; no psychosocial impact [FreeTextEntry6] : mild pinkness of skin

## 2023-02-06 NOTE — HISTORY OF PRESENT ILLNESS
[FreeTextEntry1] : Ms. Goff is a 67 year old female who presents for an on treatment visit.  She is unaccompanied for today's visit.  \par \par Diagnosis:  pT1a pN0 RIGHT Infiltrating Lobular Carcinoma, classic type (5 mm).  LCIS, classic type. Four sentinel lymph nodes negative (0/4).  Right breast superior margin with LCIS, classic type.   ER + (99%) [place] + (30%)  HER2 -  \par \par Oncologic Management:\par \par 11/11/22 - underwent RIGHT Breast, 12:00 retroareolar Biopsy:  Pathology -  Invasive moderately differentiated Mammary Carcinoma (measures at least 5 mm), [place] score 6/9, LCIS classic type.  Lymphovascular permeation by tumor not seen.  \par ER + (99%) FL + (30%)  HER2 -  \par \par 12/6/22 - underwent RIGHT Lumpectomy, RIGHT SLNB, and tissue transfer (Dr. S. Reyes - surgeon):  Pathology -  Infiltrating Lobular Carcinoma, classic type, measuring 2 mm in greatest dimension.  [place] score 6/9.  LCIS, classic type. Four sentinel lymph nodes negative (0/4).  Right breast superior margin with LCIS, classic type.  Comment:  Immunohistochemical stain for E-cadherin performed on specimen #1D is negative in the neoplastic cells. Immunohistochemical stains for cytokeratin cocktail performed on specimen  #2A, #2B, #2C and #3A are negative.\par \par 12/15/22 - seen in follow up by Dr. Reyes (surgeon) .. no Oncotype DX sent due to small size of invasive tumor.  Right axillary seroma aspiration done (30cc serous fluid).  Referrals to medical and radiation oncology made.  Breast MRI to be done in 6 months. \par Had re-aspiration of fluid done on 12/20/22 (20cc).  \par \par 1/11/23 -  saw Dr. Merlos ([facility]) in consultation ... recommended endocrine therapy after completion of radiation therapy.  \par \par 1/25/23 - started on RADIATION Therapy to RIGHT Breast, 4240 cGy in 16 fx \par \par 1/27/23 - OTV 3/16 fx completed.  Ms. Goff is doing well so far with radiation.  She has noticed that her skin is slightly pink, no warmth to area.  Some very faint pinkness noted on skin on lower part of the breast.  She is moisturizing at least twice daily with Miaderm and Aquaphor.  \par \par 2/6/23 - OTV 9/16 fx completed.   Ms. Goff is tolerating treatment well.  She has some mild tenderness near the axilla scar and occasional breast twinges.  Denies pain. Slight increase in tiredness.  Skin with mild pinkness, she is moisturizing at least twice daily.  
Breath sounds clear and equal bilaterally.

## 2023-02-06 NOTE — DISEASE MANAGEMENT
[Pathological] : TNM Stage: p [IA] : IA [TTNM] : 1a [NTNM] : 0 [MTNM] : 0 [de-identified] : [id number] [de-identified] : [id number] [de-identified] : RIGHT Breast

## 2023-02-08 PROCEDURE — 77427 RADIATION TX MANAGEMENT X5: CPT

## 2023-02-10 ENCOUNTER — NON-APPOINTMENT (OUTPATIENT)
Age: 68
End: 2023-02-10

## 2023-02-13 ENCOUNTER — NON-APPOINTMENT (OUTPATIENT)
Age: 68
End: 2023-02-13

## 2023-02-13 NOTE — HISTORY OF PRESENT ILLNESS
[FreeTextEntry1] : Ms. Goff is a 67 year old female who presents for an on treatment visit.  She is unaccompanied for today's visit.  \par \par Diagnosis:  pT1a pN0 RIGHT Infiltrating Lobular Carcinoma, classic type (5 mm).  LCIS, classic type. Four sentinel lymph nodes negative (0/4).  Right breast superior margin with LCIS, classic type.   ER + (99%) [place] + (30%)  HER2 -  \par \par Oncologic Management:\par \par 11/11/22 - underwent RIGHT Breast, 12:00 retroareolar Biopsy:  Pathology -  Invasive moderately differentiated Mammary Carcinoma (measures at least 5 mm), [place] score 6/9, LCIS classic type.  Lymphovascular permeation by tumor not seen.  \par ER + (99%) [place] + (30%)  HER2 -  \par \par 12/6/22 - underwent RIGHT Lumpectomy, RIGHT SLNB, and tissue transfer (Dr. S. Reyes - surgeon):  Pathology -  Infiltrating Lobular Carcinoma, classic type, measuring 2 mm in greatest dimension.  [place] score 6/9.  LCIS, classic type. Four sentinel lymph nodes negative (0/4).  Right breast superior margin with LCIS, classic type.  Comment:  Immunohistochemical stain for E-cadherin performed on specimen #1D is negative in the neoplastic cells. Immunohistochemical stains for cytokeratin cocktail performed on specimen  #2A, #2B, #2C and #3A are negative.\par \par 12/15/22 - seen in follow up by Dr. Reyes (surgeon) .. no Oncotype DX sent due to small size of invasive tumor.  Right axillary seroma aspiration done (30cc serous fluid).  Referrals to medical and radiation oncology made.  Breast MRI to be done in 6 months. \par Had re-aspiration of fluid done on 12/20/22 (20cc).  \par \par 1/11/23 -  saw Dr. Merlos ([facility]) in consultation ... recommended endocrine therapy after completion of radiation therapy.  \par \par 1/25/23 - started on RADIATION Therapy to RIGHT Breast, 4240 cGy in 16 fx \par \par 1/27/23 - OTV 3/16 fx completed.  Ms. Goff is doing well so far with radiation.  She has noticed that her skin is slightly pink, no warmth to area.  Some very faint pinkness noted on skin on lower part of the breast.  She is moisturizing at least twice daily with Miaderm and Aquaphor.  \par \par 2/6/23 - OTV 9/16 fx completed.   Ms. Goff is tolerating treatment well.  She has some mild tenderness near the axilla scar and occasional breast twinges.  Denies pain. Slight increase in tiredness.  Skin with mild pinkness, she is moisturizing at least twice daily.  \par \par 2/13/23 - OTV 14/16 fx completed.  Ms. Goff is glad she is almost finished, completes on Wednesday.  She has an increase in tiredness and also complains of an increase in twinges/pinches in the breast/axilla.  Skin with increase in redness today.  She is moisturizing at least twice daily using Miaderm.  Post treatment evaluation appointment scheduled for 3/24/23.  She is to start on Anastrozole with Dr. Merlos ([facility]) the beginning of March.

## 2023-02-13 NOTE — REVIEW OF SYSTEMS
[Edema Limbs: Grade 0] : Edema Limbs: Grade 0  [Fatigue: Grade 1 - Fatigue relieved by rest] : Fatigue: Grade 1 - Fatigue relieved by rest [Localized Edema: Grade 0] : Localized Edema: Grade 0  [Breast Pain: Grade 0] : Breast Pain: Grade 0 [Pruritus: Grade 0] : Pruritus: Grade 0 [Skin Atrophy: Grade 0] : Skin Atrophy: Grade 0 [Skin Hyperpigmentation: Grade 1 - Hyperpigmentation covering <10% BSA; no psychosocial impact] : Skin Hyperpigmentation: Grade 1 - Hyperpigmentation covering <10% BSA; no psychosocial impact [Skin Induration: Grade 0] : Skin Induration: Grade 0 [Dermatitis Radiation: Grade 1 - Faint erythema or dry desquamation] : Dermatitis Radiation: Grade 1 - Faint erythema or dry desquamation [FreeTextEntry7] : twinges/pinches in the breast/axilla  [FreeTextEntry6] : mild pinkness of skin

## 2023-02-13 NOTE — DISEASE MANAGEMENT
[Pathological] : TNM Stage: p [IA] : IA [TTNM] : 1a [NTNM] : 0 [MTNM] : 0 [de-identified] : [id number] [de-identified] : [id number] [de-identified] : RIGHT Breast

## 2023-03-03 ENCOUNTER — NON-APPOINTMENT (OUTPATIENT)
Age: 68
End: 2023-03-03

## 2023-03-16 ENCOUNTER — OUTPATIENT (OUTPATIENT)
Dept: OUTPATIENT SERVICES | Facility: HOSPITAL | Age: 68
LOS: 1 days | Discharge: HOME | End: 2023-03-16

## 2023-03-16 ENCOUNTER — APPOINTMENT (OUTPATIENT)
Dept: SURGICAL ONCOLOGY | Facility: CLINIC | Age: 68
End: 2023-03-16
Payer: MEDICARE

## 2023-03-16 VITALS
OXYGEN SATURATION: 99 % | RESPIRATION RATE: 15 BRPM | WEIGHT: 127 LBS | SYSTOLIC BLOOD PRESSURE: 157 MMHG | DIASTOLIC BLOOD PRESSURE: 89 MMHG | HEIGHT: 65 IN | BODY MASS INDEX: 21.16 KG/M2 | HEART RATE: 63 BPM

## 2023-03-16 DIAGNOSIS — Z98.51 TUBAL LIGATION STATUS: Chronic | ICD-10-CM

## 2023-03-16 DIAGNOSIS — Z12.31 ENCOUNTER FOR SCREENING MAMMOGRAM FOR MALIGNANT NEOPLASM OF BREAST: ICD-10-CM

## 2023-03-16 PROCEDURE — 99213 OFFICE O/P EST LOW 20 MIN: CPT

## 2023-03-16 NOTE — PHYSICAL EXAM
[Normal] : supple, no neck mass and thyroid not enlarged [Normal Neck Lymph Nodes] : normal neck lymph nodes  [Normal Supraclavicular Lymph Nodes] : normal supraclavicular lymph nodes [Normal Axillary Lymph Nodes] : normal axillary lymph nodes [Normal] : oriented to person, place and time, with appropriate affect [de-identified] : Right breast incision is healing well with no parenchymal defect. Right nipple with post-radiation edema.  Bilateral breasts with no palpable masses.  The right axillary incision is well healed with no seroma.  no erythema  [de-identified] : Normal respiratory effort

## 2023-03-16 NOTE — ASSESSMENT
[FreeTextEntry1] : 66 y/o Fw/ R  Invasive mammary carcinoma dx 11/2022 (R 12-RA , cT1 N0, +/+/- )   s/p R Vera localized PM, R SLNbx (0/4), tissue transfer on 12/6/22 ~ pT1a pN0, R ILC, classic type, LCIS. Right superior margin w/ LCIS. \par \par Oncotype Dx Not sent given small size of invasive tumor\par 11/19/22 [facility] Genetic hereditary panel (47 genes): Negative \par \par Amelia is doing well after surgery and RT.   We discussed her clinical breast exam and surveillance plan. MRI in May 2023.  \par \par - Radiation/oncology (Dr. France): completed XRT to Right breast on 2/15/2023, she has her post treatment evaluation on 3/24/23 \par - Medical oncology (Dr. Jeronimo Merlos): Adjuvant endocrine therapy, Started Anastrazole on 3/1/23.  no complaints.  \par - MRI in 2 months ( 5/23) \par - Return to office in  3 months. \par  \par

## 2023-03-16 NOTE — RESULTS/DATA
[FreeTextEntry1] : Breast rad/path review: \par 7/10/20 BL Sc MG/US - birads2 - heterogenously dense\par 10/27/21 BL Sc MG/US - birads 2 - scattered density\par 11/2/22 BL Sc MG/US - birads0 - scattered density, Right 12:00RA 4mm mass. Left breast normal.\par 11/11/22 R Dx MG/US - birads4 - Right 12:00RA 5mm angular hypoechoic mass. Normal right axilla.\par 11/11/22 R USGBx :\par ~ R 12:00 retroareolar core bx: Invasive mammary carcinoma, mod diff, ER99%, PR30%, HER2 Neg; ribbon clip-concordant. \par \par 12/6/22 s/p Right Vera localized Partial mastectomy, R SLNBx ( 0/4), tissue transfer ~pT1a pN0 ILC \par Surgical Path: \par 1. Right breast, 12:00 mass, partial mastectomy: Infiltrating lobular carcinoma, classic type, measuring 2 mm in greatest dimension.\par 2. Right breast superior margin: Lobular carcinoma in situ, classic type.\par 3. Right breast lateral, inferior, medial, posterior margin: Benign breast tissue.\par

## 2023-03-16 NOTE — HISTORY OF PRESENT ILLNESS
[de-identified] : 68 y/o F who with Right Invasive mammary carcinoma dx on 11/2022 ( R 12:00-RA 5 mm ~ cT1N0 +/+/-) that was Initially found on annual screening. She is s/p Right Partial mastectomy w/ deepthi-loc, R SLNBx (0/4), tissue transfer on 12/6/22 c/b Right axillary seroma s/p bedside aspiration (30 cc) on 12/15/22 and 12/20/2022 (20 cc) - Now resolved \par \par Fam hx: MAunt breast cancer 53, MG ovarian cancer 89 - Genetic panel Negative \par 11/19/22 [facility] Genetic hereditary panel (47 genes): Negative \par \par ~ Interval Course  \par 01/11/2023 Patient met with Dr. France for adjuvant XRT   \par 01/11/2023 Patient met with Dr. Jeronimo Merlos for adjuvant endocrine therapy, to start anastrozole 1 mg tab for up to 5 to 10 yrs following XRT \par 01/25/2023 Started Radiation therapy to Right Breast \par 02/15//2023 Completed Radiation therapy  to Right breast \par 03/01/2023 Started Anastrozole \par \par 3/16: Since Last visit: Completed Right breast XRT, started on anastrazole - tolerating it well.  States she has occasional intermittent Right axillary discomfort/ twinges that really doesn't bother her - Working, doing her ADLs. She reports some R UIQ tenderness that she noticed but denies feeling any masses.

## 2023-03-20 ENCOUNTER — APPOINTMENT (OUTPATIENT)
Dept: HEMATOLOGY ONCOLOGY | Facility: CLINIC | Age: 68
End: 2023-03-20
Payer: MEDICARE

## 2023-03-20 VITALS
OXYGEN SATURATION: 99 % | WEIGHT: 131.18 LBS | SYSTOLIC BLOOD PRESSURE: 178 MMHG | RESPIRATION RATE: 16 BRPM | DIASTOLIC BLOOD PRESSURE: 80 MMHG | TEMPERATURE: 97.3 F | BODY MASS INDEX: 21.83 KG/M2 | HEART RATE: 88 BPM

## 2023-03-20 PROCEDURE — 99213 OFFICE O/P EST LOW 20 MIN: CPT

## 2023-03-20 NOTE — CONSULT LETTER
[Dear  ___] : Dear  [unfilled], [Courtesy Letter:] : I had the pleasure of seeing your patient, [unfilled], in my office today. [Please see my note below.] : Please see my note below. [Consult Closing:] : Thank you very much for allowing me to participate in the care of this patient.  If you have any questions, please do not hesitate to contact me. [Sincerely,] : Sincerely, [FreeTextEntry2] : Sylvia Reyes MD\par [address] [place] [address] \[place] [address]  [FreeTextEntry3] : Jeronimo Merlos MD\par Attending\par [facility]\par  [DrMyra  ___] : Dr. MENDEZ

## 2023-03-20 NOTE — PHYSICAL EXAM
[Fully active, able to carry on all pre-disease performance without restriction] : Status 0 - Fully active, able to carry on all pre-disease performance without restriction [Normal] : affect appropriate [de-identified] : right lumpectomy and SLNB site with mild edema and hyperpigmentation, no abnl masses or axillary LN palpable

## 2023-03-20 NOTE — ASSESSMENT
[FreeTextEntry1] : She is a 66 y/o F with right Stage I invasive lobular carcinoma ER + (99%) DE + (30%) HER2 negative s/p lumpectomy and SLNB. She completed RT to the breast with Dr France and started on anastrozole 3/1/2023. She currently has not noticed any intolerable SE. She understands she can call and reviewed SE and supportive measures at any time with us. Reviewed massage over the lumpectomy site to decrease swelling. Will have interval blood work with PCP in June. Gave Rx for bone density: follow up osteopenia of femoral neck 2021. We reviewed calcium and Vitamin D supplementation along with exercise to maintain bone health. Next follow up in 6 months but earlier if any new symptoms.\par

## 2023-03-20 NOTE — REASON FOR VISIT
[Follow-Up Visit] : a follow-up [Spouse] : spouse [FreeTextEntry2] : follow up for breast cancer on anastrozole

## 2023-03-20 NOTE — HISTORY OF PRESENT ILLNESS
[Disease: _____________________] : Disease: [unfilled] [T: ___] : T[unfilled] [N: ___] : N[unfilled] [AJCC Stage: ____] : AJCC Stage: [unfilled] [de-identified] : Age 67: right breast cancer\par Screen detected: she had her interval breast imaging done on 10/28/22 which showed asymmetry in the low right axilla on mammography. Further evaluation with right MLO spot compression view with tomosynthesis and possible targeted right breast ultrasound was recommended. Newly visualized small hypoechoic mass in the right breast 12:00 retroareolar location on sonography. Additional imaging done on 11/2022 showed indeterminate hypoechoic mass in the right breast at 12:00, retroareolar for which ultrasound-guided core needle biopsy is recommended. She underwent on 11/11/22 - underwent right breast 12:00 retroareolar biopsy which showed invasive moderately differentiated mammary Carcinoma (measures at least 5 mm), [place] score 6/9, LCIS classic type. Lymphovascular permeation by tumor not seen. ER + (99%) MD + (30%) HER2 negative. On 12/6/22, she underwent RIGHT Lumpectomy, RIGHT SLNB, and tissue transfer with Dr. S. Reyes. The pathology showed infiltrating lobular carcinoma, classic type, measuring 2 mm in greatest dimension. [place] score 6/9. LCIS, classic type. Four sentinel lymph nodes negative (0/4). Right breast superior margin with LCIS, classic type. She completed RT with Dr France and started anastrozole 3/1/2023. \par  [de-identified] :  infiltrating lobular carcinoma ER + (99%) DC + (30%) HER2 negative [de-identified] : [facility] 47 gene panel 11/2022: no actionable mutations \par anastrozole 3/1/2023 to present  [de-identified] : She denies any pain over the breast: tolerated RT well. She started anastrozole and felt mild fatigue: taking in the evening after work and feels OK. Denies any new hot flashes or joint pain. She will be seeing cardiologist and may have adjustment to BP medications. Next visit with PCP in June and will have interval blood work.

## 2023-03-20 NOTE — REVIEW OF SYSTEMS
[Proptosis] : no proptosis [Hot Flashes] : hot flashes [Muscle Weakness] : no muscle weakness [Deepening Of The Voice] : no deepening of the voice [Negative] : Allergic/Immunologic

## 2023-03-24 ENCOUNTER — APPOINTMENT (OUTPATIENT)
Dept: RADIATION ONCOLOGY | Facility: CLINIC | Age: 68
End: 2023-03-24
Payer: MEDICARE

## 2023-03-24 ENCOUNTER — NON-APPOINTMENT (OUTPATIENT)
Age: 68
End: 2023-03-24

## 2023-03-24 VITALS
BODY MASS INDEX: 21.72 KG/M2 | WEIGHT: 130.51 LBS | SYSTOLIC BLOOD PRESSURE: 154 MMHG | RESPIRATION RATE: 16 BRPM | DIASTOLIC BLOOD PRESSURE: 84 MMHG | OXYGEN SATURATION: 100 % | TEMPERATURE: 97.52 F | HEART RATE: 60 BPM

## 2023-03-24 PROCEDURE — 99024 POSTOP FOLLOW-UP VISIT: CPT

## 2023-03-24 NOTE — REVIEW OF SYSTEMS
[Negative] : Allergic/Immunologic [Fatigue: Grade 0] : Fatigue: Grade 0 [Pruritus: Grade 0] : Pruritus: Grade 0 [Skin Atrophy: Grade 0] : Skin Atrophy: Grade 0 [Skin Hyperpigmentation: Grade 1 - Hyperpigmentation covering <10% BSA; no psychosocial impact] : Skin Hyperpigmentation: Grade 1 - Hyperpigmentation covering <10% BSA; no psychosocial impact [Skin Induration: Grade 0] : Skin Induration: Grade 0 [Dermatitis Radiation: Grade 0] : Dermatitis Radiation: Grade 0

## 2023-03-24 NOTE — HISTORY OF PRESENT ILLNESS
[FreeTextEntry1] : Ms. Goff is a 67 year old female who presents for post treatment evaluation appointment.  \par \par Diagnosis:  pT1a pN0 RIGHT Infiltrating Lobular Carcinoma, classic type (5 mm).  LCIS, classic type. Four sentinel lymph nodes negative (0/4).  Right breast superior margin with LCIS, classic type.   ER + (99%) OK + (30%)  HER2 -  \par \par Oncologic Management:\par \par 11/11/22 - underwent RIGHT Breast, 12:00 retroareolar Biopsy:  Pathology -  Invasive moderately differentiated Mammary Carcinoma (measures at least 5 mm), Dennise score 6/9, LCIS classic type.  Lymphovascular permeation by tumor not seen.  \par ER + (99%) OK + (30%)  HER2 -  \par \par 12/6/22 - underwent RIGHT Lumpectomy, RIGHT SLNB, and tissue transfer (Dr. S. Reyes - surgeon):  Pathology -  Infiltrating Lobular Carcinoma, classic type, measuring 2 mm in greatest dimension.  Hendrix score 6/9.  LCIS, classic type. Four sentinel lymph nodes negative (0/4).  Right breast superior margin with LCIS, classic type.  Comment:  Immunohistochemical stain for E-cadherin performed on specimen #1D is negative in the neoplastic cells. Immunohistochemical stains for cytokeratin cocktail performed on specimen  #2A, #2B, #2C and #3A are negative.\par \par 12/15/22 - seen in follow up by Dr. Reyes (surgeon) .. no Oncotype DX sent due to small size of invasive tumor.  Right axillary seroma aspiration done (30cc serous fluid).  Referrals to medical and radiation oncology made.  Breast MRI to be done in 6 months. \par Had re-aspiration of fluid done on 12/20/22 (20cc).  \par \par 1/11/23 -  saw Dr. Merlos ([facility]) in consultation ... recommended endocrine therapy after completion of radiation therapy.  \par \par 1/25/23 - 2/15/23:  Received RADIATION Therapy to RIGHT Breast, 4240 cGy in 16 fx \par \par 3/2023 - started on Anastrozole (Dr. Merlos - [facility])\par \par 3/24/23 - presents for post treatment evaluation appointment.  \par Skin has recovered well from radiation.She continues to follow with Dr. Merlos ([facility]) and Dr. Reyes (surgeon). Reports feeling well, no complaints offered. Remains on anastrazole.\par

## 2023-03-24 NOTE — PHYSICAL EXAM
[No UE Edema] : there is no upper extremity edema [Normal] : normal skin color and pigmentation and no rash [de-identified] : right breast recovered well from radiation. Mild persistent hyperpigmentation.

## 2023-05-17 ENCOUNTER — RX RENEWAL (OUTPATIENT)
Age: 68
End: 2023-05-17

## 2023-06-02 ENCOUNTER — OUTPATIENT (OUTPATIENT)
Dept: OUTPATIENT SERVICES | Facility: HOSPITAL | Age: 68
LOS: 1 days | End: 2023-06-02
Payer: MEDICARE

## 2023-06-02 ENCOUNTER — APPOINTMENT (OUTPATIENT)
Dept: MRI IMAGING | Facility: IMAGING CENTER | Age: 68
End: 2023-06-02
Payer: MEDICARE

## 2023-06-02 DIAGNOSIS — C50.911 MALIGNANT NEOPLASM OF UNSPECIFIED SITE OF RIGHT FEMALE BREAST: ICD-10-CM

## 2023-06-02 DIAGNOSIS — Z98.51 TUBAL LIGATION STATUS: Chronic | ICD-10-CM

## 2023-06-02 PROCEDURE — 77049 MRI BREAST C-+ W/CAD BI: CPT | Mod: 26

## 2023-06-02 PROCEDURE — C8908: CPT

## 2023-06-02 PROCEDURE — C8937: CPT

## 2023-06-02 PROCEDURE — A9585: CPT

## 2023-06-08 ENCOUNTER — APPOINTMENT (OUTPATIENT)
Dept: INTERNAL MEDICINE | Facility: CLINIC | Age: 68
End: 2023-06-08
Payer: MEDICARE

## 2023-06-08 VITALS
HEART RATE: 65 BPM | BODY MASS INDEX: 21.66 KG/M2 | TEMPERATURE: 207.68 F | OXYGEN SATURATION: 99 % | WEIGHT: 130 LBS | SYSTOLIC BLOOD PRESSURE: 182 MMHG | DIASTOLIC BLOOD PRESSURE: 76 MMHG | HEIGHT: 65 IN

## 2023-06-08 VITALS — DIASTOLIC BLOOD PRESSURE: 80 MMHG | SYSTOLIC BLOOD PRESSURE: 160 MMHG

## 2023-06-08 DIAGNOSIS — R03.0 ELEVATED BLOOD-PRESSURE READING, W/OUT DIAGNOSIS OF HYPERTENSION: ICD-10-CM

## 2023-06-08 PROCEDURE — G0439: CPT

## 2023-06-08 NOTE — HEALTH RISK ASSESSMENT
[Good] : ~his/her~  mood as  good [Yes] : Yes [Employed] : employed [] :  [Fully functional (bathing, dressing, toileting, transferring, walking, feeding)] : Fully functional (bathing, dressing, toileting, transferring, walking, feeding) [Fully functional (using the telephone, shopping, preparing meals, housekeeping, doing laundry, using] : Fully functional and needs no help or supervision to perform IADLs (using the telephone, shopping, preparing meals, housekeeping, doing laundry, using transportation, managing medications and managing finances) [Smoke Detector] : smoke detector [Carbon Monoxide Detector] : carbon monoxide detector [Safety elements used in home] : safety elements used in home [Seat Belt] :  uses seat belt [Sunscreen] : uses sunscreen [No falls in past year] : Patient reported no falls in the past year [0] : 2) Feeling down, depressed, or hopeless: Not at all (0) [PHQ-2 Negative - No further assessment needed] : PHQ-2 Negative - No further assessment needed [Patient reported colonoscopy was normal] : Patient reported colonoscopy was normal [HIV test declined] : HIV test declined [Hepatitis C test declined] : Hepatitis C test declined [None] : None [With Significant Other] : lives with significant other [# Of Children ___] : has [unfilled] children [Feels Safe at Home] : Feels safe at home [Never] : Never [de-identified] : OCCAS [de-identified] : yes [de-identified] : healthy [[id number]] : 0 [Change in mental status noted] : No change in mental status noted [Language] : denies difficulty with language [High Risk Behavior] : no high risk behavior [Reports changes in hearing] : Reports no changes in hearing [Reports changes in vision] : Reports no changes in vision [Reports changes in dental health] : Reports no changes in dental health [MammogramDate] : 12/22 [BoneDensityDate] : 10/21 [ColonoscopyDate] : 2019 [ColonoscopyComments] : 10 yrs [FreeTextEntry2] :  home [de-identified] : optho is due

## 2023-06-08 NOTE — HISTORY OF PRESENT ILLNESS
[FreeTextEntry1] : CPE [de-identified] : 69 yo woman for CPE, new to me\par Right breast ca. s/p partial mastec/RT/Anastrazole\par Htn, white coat component; mild HLD, mild O.penia\par \par Pt is seeing Cardiol Dr. Ferrara, is on Losartsn 100 and Metopr 50 bid. Home readings vary but mostly normal, highest ~ 154/80.\par She is now watching her salt intake.\par Dexa due 10/23\par [place] ' due \par MRI Breast last wk, ?findings left breast, cyst on liver-->US, will see Drs. Reyes and Gaurang next wk*\par Derm: needs names\par Gyn uptodate\par \par Shingles vax: pneding\par *She is feeling stressed over the findings. Also works 6 dd/wk at  home, plans to cut back to 5 dd/wk soon.

## 2023-06-08 NOTE — ASSESSMENT
[FreeTextEntry1] : Pt as outlined.\par BP elevated, she is anxious today. She will contin to monitor at home and d/w Cardiol.\par Check labs when fasting.\par Dexa in the fall.\par Derm names provided for skin check.\par \par

## 2023-06-20 ENCOUNTER — APPOINTMENT (OUTPATIENT)
Dept: SURGICAL ONCOLOGY | Facility: CLINIC | Age: 68
End: 2023-06-20
Payer: MEDICARE

## 2023-06-20 VITALS
WEIGHT: 128 LBS | HEART RATE: 62 BPM | DIASTOLIC BLOOD PRESSURE: 77 MMHG | BODY MASS INDEX: 21.33 KG/M2 | OXYGEN SATURATION: 99 % | SYSTOLIC BLOOD PRESSURE: 134 MMHG | RESPIRATION RATE: 16 BRPM | HEIGHT: 65 IN

## 2023-06-20 PROCEDURE — 99213 OFFICE O/P EST LOW 20 MIN: CPT

## 2023-06-20 NOTE — HISTORY OF PRESENT ILLNESS
[de-identified] : 66 y/o F who with Right Invasive mammary carcinoma dx on 11/2022 ( R 12:00-RA 5 mm ~ cT1N0 +/+/-) that was Initially found on annual screening. She is s/p Right Partial mastectomy w/ deepthi-loc, R SLNBx (0/4), tissue transfer on 12/6/22 c/b Right axillary seroma s/p bedside aspiration (30 cc) on 12/15/22 and 12/20/2022 (20 cc) - Now resolved \par \par Fam hx: Behzad breast cancer 53, MG ovarian cancer 88 - Genetic panel Negative \par 11/19/22 [facility] Genetic hereditary panel (47 genes): Negative \par \par ~ Interval Course  \par 01/11/2023 Patient met with Dr. France for adjuvant XRT   \par 01/11/2023 Patient met with Dr. Jeronimo Merlos for adjuvant endocrine therapy, to start anastrozole 1 mg tab for up to 5 to 10 yrs following XRT \par 01/25/2023 Started Radiation therapy to Right Breast \par 02/15//2023 Completed Radiation therapy  to Right breast \par 03/01/2023 Started Anastrozole \par \par 3/16: Since Last visit: Completed Right breast XRT, started on anastrazole - tolerating it well.  States she has occasional intermittent Right axillary discomfort/ twinges that really doesn't bother her - Working, doing her ADLs. She reports some R UIQ tenderness that she noticed but denies feeling any masses. \par \par 6/20/23: Since Last visit has undergone a Breast MRI rated BIRADs 3 for L LOQ 5 mm dominant focus of enhancement and noted post surgical/ post RT changes. At this visit is c/o some right breast discomfort after RT along the inner and outer quadrants.

## 2023-06-20 NOTE — ASSESSMENT
[FreeTextEntry1] : 66 y/o F w/ hx of Right Invasive mammary carcinoma dx 11/2022 (R 12-RA , cT1 N0, +/+/- ) s/p R Vera loc PM, R SLNbx (0/4), tissue transfer on 12/6/22 ~ pT1a pN0, Path: R ILC classic type, LCIS. Right superior margin w/ LCIS s/p RT on adjuvant anastrozole \par \par Oncotype Dx: Not sent out given small size of invasive tumor\par 11/19/22 [facility] ( full panel): Negative \par \par   \par - Next imaging: Annual Dx MG/ R Dx US due October 2023, 6 month f/u Breast MRI due December 2023 \par \par - Referred to Physical therapy to Lymphedema \par - Radiation/oncology (Dr. France): completed XRT to Right breast on 2/15/2023 \par - Medical oncology (Dr. Jeronimo Merlos): Adjuvant endocrine therapy, Started Anastrazole on 3/1/23. \par - Return to office in 6 months. She knows to return sooner if any breast abnormalities or if any breast issues/concerns arise\par  \par

## 2023-06-20 NOTE — RESULTS/DATA
[FreeTextEntry1] : Breast rad/path review: \par 7/10/20 BL Sc MG/US - birads2 - heterogenously dense\par 10/27/21 BL Sc MG/US - birads 2 - scattered density\par 11/2/22 BL Sc MG/US - birads0 - scattered density, Right 12:00RA 4mm mass. Left breast normal.\par 11/11/22 R Dx MG/US - birads4 - Right 12:00RA 5mm angular hypoechoic mass. Normal right axilla.\par 11/11/22 R USGBx :\par ~ R 12:00 retroareolar core bx: Invasive mammary carcinoma, mod diff, ER99%, PR30%, HER2 Neg; ribbon clip-concordant. \par \par 12/6/22 s/p Right Vera localized Partial mastectomy, R SLNBx ( 0/4), tissue transfer ~pT1a pN0 ILC \par Surgical Path: \par 1. Right breast, 12:00 mass, partial mastectomy: Infiltrating lobular carcinoma, classic type, measuring 2 mm in greatest dimension.\par 2. Right breast superior margin: Lobular carcinoma in situ, classic type.\par 3. Right breast lateral, inferior, medial, posterior margin: Benign breast tissue.\par \par 6/2/23 Breast MRI: BIRADs 3. \par - L LOQ 5 mm T2 hyperintense dominant focus of enhancement ( Rec 6 month f/u MRI to ascertain stability)\par - R post lumpectomy and post-treatment changes. Unilateral edema pattern w/ mild asymmetric diffuse enhancement of breast parenchyma and dermis - ( Rec correlation w/ final surgical pathology margins)\par - 1.8 cm T2 hyperintense non-enhancing hepatic lesion likely representing cyst ( Rec US of abdomen) \par

## 2023-06-27 LAB
25(OH)D3 SERPL-MCNC: 38.1 NG/ML
ALBUMIN SERPL ELPH-MCNC: 4.7 G/DL
ALP BLD-CCNC: 55 U/L
ALT SERPL-CCNC: 22 U/L
ANION GAP SERPL CALC-SCNC: 13 MMOL/L
AST SERPL-CCNC: 21 U/L
BILIRUB SERPL-MCNC: 0.4 MG/DL
BUN SERPL-MCNC: 14 MG/DL
CALCIUM SERPL-MCNC: 10 MG/DL
CHLORIDE SERPL-SCNC: 104 MMOL/L
CHOLEST SERPL-MCNC: 240 MG/DL
CO2 SERPL-SCNC: 25 MMOL/L
CREAT SERPL-MCNC: 0.84 MG/DL
EGFR: 77 ML/MIN/1.73M2
ESTIMATED AVERAGE GLUCOSE: 114 MG/DL
GLUCOSE SERPL-MCNC: 99 MG/DL
HBA1C MFR BLD HPLC: 5.6 %
HDLC SERPL-MCNC: 65 MG/DL
LDLC SERPL CALC-MCNC: 157 MG/DL
NONHDLC SERPL-MCNC: 176 MG/DL
POTASSIUM SERPL-SCNC: 4.6 MMOL/L
PROT SERPL-MCNC: 7.4 G/DL
SODIUM SERPL-SCNC: 141 MMOL/L
TRIGL SERPL-MCNC: 95 MG/DL
TSH SERPL-ACNC: 3.95 UIU/ML
VIT B12 SERPL-MCNC: 768 PG/ML

## 2023-09-21 ENCOUNTER — OUTPATIENT (OUTPATIENT)
Dept: OUTPATIENT SERVICES | Facility: HOSPITAL | Age: 68
LOS: 1 days | Discharge: HOME | End: 2023-09-21

## 2023-09-21 DIAGNOSIS — Z12.31 ENCOUNTER FOR SCREENING MAMMOGRAM FOR MALIGNANT NEOPLASM OF BREAST: ICD-10-CM

## 2023-09-21 DIAGNOSIS — Z98.51 TUBAL LIGATION STATUS: Chronic | ICD-10-CM

## 2023-09-28 ENCOUNTER — APPOINTMENT (OUTPATIENT)
Dept: DERMATOLOGY | Facility: CLINIC | Age: 68
End: 2023-09-28
Payer: MEDICARE

## 2023-09-28 PROCEDURE — 99203 OFFICE O/P NEW LOW 30 MIN: CPT

## 2023-09-29 ENCOUNTER — APPOINTMENT (OUTPATIENT)
Dept: HEMATOLOGY ONCOLOGY | Facility: CLINIC | Age: 68
End: 2023-09-29
Payer: MEDICARE

## 2023-09-29 VITALS
WEIGHT: 132.05 LBS | DIASTOLIC BLOOD PRESSURE: 91 MMHG | SYSTOLIC BLOOD PRESSURE: 171 MMHG | RESPIRATION RATE: 16 BRPM | OXYGEN SATURATION: 99 % | HEART RATE: 64 BPM | BODY MASS INDEX: 21.97 KG/M2 | TEMPERATURE: 97.1 F

## 2023-09-29 PROCEDURE — 99213 OFFICE O/P EST LOW 20 MIN: CPT

## 2023-10-01 PROBLEM — Z92.3 HISTORY OF RADIATION THERAPY: Status: RESOLVED | Noted: 2023-01-27 | Resolved: 2023-10-01

## 2023-10-30 ENCOUNTER — APPOINTMENT (OUTPATIENT)
Dept: RADIOLOGY | Facility: IMAGING CENTER | Age: 68
End: 2023-10-30
Payer: MEDICARE

## 2023-10-30 ENCOUNTER — RESULT REVIEW (OUTPATIENT)
Age: 68
End: 2023-10-30

## 2023-10-30 ENCOUNTER — APPOINTMENT (OUTPATIENT)
Dept: ULTRASOUND IMAGING | Facility: IMAGING CENTER | Age: 68
End: 2023-10-30
Payer: MEDICARE

## 2023-10-30 ENCOUNTER — APPOINTMENT (OUTPATIENT)
Dept: MAMMOGRAPHY | Facility: IMAGING CENTER | Age: 68
End: 2023-10-30
Payer: MEDICARE

## 2023-10-30 ENCOUNTER — OUTPATIENT (OUTPATIENT)
Dept: OUTPATIENT SERVICES | Facility: HOSPITAL | Age: 68
LOS: 1 days | End: 2023-10-30
Payer: MEDICARE

## 2023-10-30 DIAGNOSIS — C50.911 MALIGNANT NEOPLASM OF UNSPECIFIED SITE OF RIGHT FEMALE BREAST: ICD-10-CM

## 2023-10-30 DIAGNOSIS — Z98.51 TUBAL LIGATION STATUS: Chronic | ICD-10-CM

## 2023-10-30 PROCEDURE — 77085 DXA BONE DENSITY AXL VRT FX: CPT | Mod: 26

## 2023-10-30 PROCEDURE — 77066 DX MAMMO INCL CAD BI: CPT

## 2023-10-30 PROCEDURE — 77066 DX MAMMO INCL CAD BI: CPT | Mod: 26

## 2023-10-30 PROCEDURE — 76642 ULTRASOUND BREAST LIMITED: CPT | Mod: 26,RT

## 2023-10-30 PROCEDURE — 77085 DXA BONE DENSITY AXL VRT FX: CPT

## 2023-10-30 PROCEDURE — G0279: CPT | Mod: 26

## 2023-10-30 PROCEDURE — 76642 ULTRASOUND BREAST LIMITED: CPT

## 2023-10-30 PROCEDURE — G0279: CPT

## 2023-12-05 ENCOUNTER — NON-APPOINTMENT (OUTPATIENT)
Age: 68
End: 2023-12-05

## 2023-12-11 ENCOUNTER — OUTPATIENT (OUTPATIENT)
Dept: OUTPATIENT SERVICES | Facility: HOSPITAL | Age: 68
LOS: 1 days | End: 2023-12-11
Payer: MEDICARE

## 2023-12-11 ENCOUNTER — APPOINTMENT (OUTPATIENT)
Dept: MRI IMAGING | Facility: IMAGING CENTER | Age: 68
End: 2023-12-11
Payer: MEDICARE

## 2023-12-11 DIAGNOSIS — Z98.51 TUBAL LIGATION STATUS: Chronic | ICD-10-CM

## 2023-12-11 DIAGNOSIS — R92.8 OTHER ABNORMAL AND INCONCLUSIVE FINDINGS ON DIAGNOSTIC IMAGING OF BREAST: ICD-10-CM

## 2023-12-11 PROCEDURE — C8937: CPT

## 2023-12-11 PROCEDURE — 77049 MRI BREAST C-+ W/CAD BI: CPT | Mod: 26

## 2023-12-11 PROCEDURE — C8908: CPT

## 2023-12-11 PROCEDURE — A9585: CPT

## 2023-12-19 ENCOUNTER — APPOINTMENT (OUTPATIENT)
Dept: SURGICAL ONCOLOGY | Facility: CLINIC | Age: 68
End: 2023-12-19
Payer: MEDICARE

## 2023-12-19 PROCEDURE — 99213 OFFICE O/P EST LOW 20 MIN: CPT

## 2024-02-18 NOTE — HISTORY OF PRESENT ILLNESS
[de-identified] : 68 y/o F w/ hx of Right cT1 cN0 Invasive mammary carcinoma +/+/- dx 11/2022 s/p R Vera loc PM, R SLNbx (0/4), TT on 12/6/22. pT1a pN0.  [Path: ILC 2mm. Additional superior margin w/ LCIS, classic type] completed RT to right breast, on adjuvant anastrozole. Here for 6-month follow up.   Fam hx: MAunt breast cancer 53, MGM ovarian cancer 88 - Genetic panel Negative  11/19/22 [facility] Genetic hereditary panel (47 genes): Negative   Interval Course   01/11/2023 Patient met with Dr. France 01/11/2023 Patient met with Dr. Jeronimo Merlos  1/25/2023- 2/15/2023 Completed Radiation therapy to Right breast  03/01/2023 Started Anastrozole  3/16: Since Last visit: Completed Right breast XRT, started on anastrozole - tolerating it well.  States she has occasional intermittent Right axillary discomfort/ twinges that really doesn't bother her - Working, doing her ADLs. She reports some R UIQ tenderness that she noticed but denies feeling any masses.  6/20/23: Since Last visit has undergone a Breast MRI rated BIRADs 3 for L LOQ 5 mm dominant focus of enhancement and noted post-surgical/ post RT changes. At this visit is c/o some right breast discomfort after RT along the inner and outer quadrants. 12/19/23 Since last visit has undergone BL Dx MG/ R Dx US rated BR 2. Breast MRI rated BR 3 for stable left 4:00 5 mm enhancing mass. Reports continued right breast fullness, improved after PT though still intermittently causing discomfort. Tolerating anastrozole.

## 2024-02-18 NOTE — PHYSICAL EXAM
[Normal] : supple, no neck mass and thyroid not enlarged [Normal Neck Lymph Nodes] : normal neck lymph nodes  [Normal Supraclavicular Lymph Nodes] : normal supraclavicular lymph nodes [Normal Axillary Lymph Nodes] : normal axillary lymph nodes [Normal] : oriented to person, place and time, with appropriate affect [de-identified] : Right breast incision is well-healed with no parenchymal defect. Right nipple with mild post-radiation edema.  Bilateral breasts with no palpable masses.  The right axillary incision is well healed with no seroma.  no erythema  [de-identified] : Normal respiratory effort

## 2024-02-18 NOTE — RESULTS/DATA
[FreeTextEntry1] : Breast rad/path review:  7/10/20 BL SC MG/US - birads2 - heterogeneously dense 10/27/21 BL Sc MG/US - birads 2 - scattered density 11/2/22 BL Sc MG/US - birads0 - scattered density, R 12:00RA 4mm mass. Left breast-ok  11/11/22 R Dx MG/US - birads4 - R 12:00RA 5 mm angular hypoechoic mass. Right axilla-ok  11/11/22 R US Guided Bx: 1. R 12:00 retro areolar core bx: Invasive mammary carcinoma, Grade2, ER99%, PR30%, HER2 Neg; ribbon clip. Concordant.   12/6/22 s/p Right Vera loc PM, R SLNBx  (0/4), tissue transfer ~pT1a pN0   Surgical Path:  1. Right breast, 12:00 mass, Lumpectomy: Infiltrating lobular carcinoma, classic type, measuring 2 mm in greatest dimension. 2. Right breast superior margin: Lobular carcinoma in situ, classic type. 3. Right breast lateral, inferior, medial, posterior margin: Benign breast tissue.  6/2/23 Breast MRI: BIRADs 3.  - L LOQ 5 mm T2 hyperintense dominant focus of enhancement (Rec 6-month f/u MRI to ascertain stability) - R post lumpectomy and post-treatment changes. Unilateral edema pattern w/ mild asymmetric diffuse enhancement of breast parenchyma and dermis - ( Rec correlation w/ final surgical pathology margins) - 1.8 cm T2 hyperintense non-enhancing hepatic lesion likely representing cyst (Rec US of abdomen)    10/30/23 BL Dx MG/ R Dx US - birad 2 - scattered fibroglandular densities. R 12:00 post-op scarring and superficial oil cyst (rec 1yr MG) 12/11/23 BL MRI: birad 3 - R stable postsurgical change. mild diffuse edema of breast and skin, mildly improved - Stable L 4:00 5mm enhancing mass (rec 6mo f/u MRI for stability) - L 1.8 cm hepatic lobe cyst

## 2024-02-18 NOTE — ASSESSMENT
[FreeTextEntry1] : 68 y/o F w/ hx of Right cT1 cN0 Invasive mammary carcinoma +/+/- dx 11/2022 (R 12-RA) s/p R Vera loc PM, R SLNbx (0/4), TT on 12/6/22. pT1a pN0.  [Path: ILC classic type, LCIS. Right superior margin w/ LCIS, classic type] completed RT to right breast, on adjuvant anastrozole. Oncotype Dx: Not sent out given small size of invasive tumor (insufficient tissue) 11/19/22 [employer] (full panel): Negative  Clinical breast exam today is benign and with no suspicious findings.  - Next imaging: Annual BL Dx MG/ R Dx US due November 2024, 6-month f/u Breast MRI due June 2024 - Radiation oncology (Dr. France): completed XRT to Right breast on 2/15/2023 - Medical oncology (Dr. Jeronimo Merlos): Adjuvant endocrine therapy, Started anastrozole on 3/1/23. - Return to office in 6 months. She knows to return sooner if any breast abnormalities or if any breast issues/concerns arise.

## 2024-02-29 ENCOUNTER — RX RENEWAL (OUTPATIENT)
Age: 69
End: 2024-02-29

## 2024-02-29 RX ORDER — ANASTROZOLE TABLETS 1 MG/1
1 TABLET ORAL
Qty: 90 | Refills: 1 | Status: ACTIVE | COMMUNITY
Start: 2023-02-10 | End: 1900-01-01

## 2024-03-06 ENCOUNTER — OUTPATIENT (OUTPATIENT)
Dept: OUTPATIENT SERVICES | Facility: HOSPITAL | Age: 69
LOS: 1 days | Discharge: HOME | End: 2024-03-06

## 2024-03-06 DIAGNOSIS — C50.911 MALIGNANT NEOPLASM OF UNSPECIFIED SITE OF RIGHT FEMALE BREAST: ICD-10-CM

## 2024-03-06 DIAGNOSIS — Z98.51 TUBAL LIGATION STATUS: Chronic | ICD-10-CM

## 2024-03-15 ENCOUNTER — APPOINTMENT (OUTPATIENT)
Dept: HEMATOLOGY ONCOLOGY | Facility: CLINIC | Age: 69
End: 2024-03-15
Payer: MEDICARE

## 2024-03-15 VITALS
SYSTOLIC BLOOD PRESSURE: 165 MMHG | BODY MASS INDEX: 21.83 KG/M2 | OXYGEN SATURATION: 98 % | WEIGHT: 131.15 LBS | HEART RATE: 61 BPM | DIASTOLIC BLOOD PRESSURE: 76 MMHG | TEMPERATURE: 97.3 F | RESPIRATION RATE: 16 BRPM

## 2024-03-15 DIAGNOSIS — Z17.0 MALIGNANT NEOPLASM OF UNSPECIFIED SITE OF RIGHT FEMALE BREAST: ICD-10-CM

## 2024-03-15 DIAGNOSIS — K76.9 LIVER DISEASE, UNSPECIFIED: ICD-10-CM

## 2024-03-15 DIAGNOSIS — C50.911 MALIGNANT NEOPLASM OF UNSPECIFIED SITE OF RIGHT FEMALE BREAST: ICD-10-CM

## 2024-03-15 DIAGNOSIS — M85.80 OTHER SPECIFIED DISORDERS OF BONE DENSITY AND STRUCTURE, UNSPECIFIED SITE: ICD-10-CM

## 2024-03-15 PROCEDURE — 99214 OFFICE O/P EST MOD 30 MIN: CPT

## 2024-03-15 RX ORDER — OLMESARTAN MEDOXOMIL 40 MG/1
40 TABLET, FILM COATED ORAL
Refills: 0 | Status: ACTIVE | COMMUNITY
Start: 2024-03-15

## 2024-03-15 RX ORDER — VALACYCLOVIR 500 MG/1
500 TABLET, FILM COATED ORAL TWICE DAILY
Qty: 60 | Refills: 1 | Status: DISCONTINUED | COMMUNITY
Start: 2023-03-02 | End: 2024-03-15

## 2024-03-15 RX ORDER — LOSARTAN POTASSIUM 100 MG/1
100 TABLET, FILM COATED ORAL
Refills: 0 | Status: DISCONTINUED | COMMUNITY
End: 2024-03-15

## 2024-03-15 NOTE — REVIEW OF SYSTEMS
[Diarrhea: Grade 0] : Diarrhea: Grade 0 [Joint Pain] : joint pain [Joint Stiffness] : joint stiffness [Negative] : Allergic/Immunologic [Muscle Weakness] : no muscle weakness [Muscle Pain] : no muscle pain [FreeTextEntry9] : over the hands and diffuse

## 2024-03-15 NOTE — ASSESSMENT
[FreeTextEntry1] : She is a 69 y/o F with right Stage I invasive lobular carcinoma ER + (99%) [place] + (30%) HER2 negative s/p lumpectomy and SLNB. She completed RT to the breast with Dr France and started on anastrozole 3/1/2023. She expected Grade 1 arthralgias: reviewed supportive measures. Reviewed breast imaging and exam: no new findings. We reviewed low fat diet and exercise daily to help improve breast cancer survival as per WINS study. We reviewed goals of endocrine therapy. She is willing to remain on therapy. Questions answered to her satisfaction. She is agreeable with plan. Next follow up in 6 months but earlier if any new symptoms.  Bone density: osteopenia: stable: next bone density due 2025.   MRI breast with liver lesion: most likely liver cyst: will have interval RUQ sonogram to follow up.

## 2024-03-15 NOTE — PHYSICAL EXAM
[Fully active, able to carry on all pre-disease performance without restriction] : Status 0 - Fully active, able to carry on all pre-disease performance without restriction [Normal] : affect appropriate [de-identified] : right lumpectomy, no abnl masses or axillary LN palpable

## 2024-03-15 NOTE — HISTORY OF PRESENT ILLNESS
[T: ___] : T[unfilled] [Disease: _____________________] : Disease: [unfilled] [N: ___] : N[unfilled] [AJCC Stage: ____] : AJCC Stage: [unfilled] [de-identified] : Age 67: right breast cancer\par  Screen detected: she had her interval breast imaging done on 10/28/22 which showed asymmetry in the low right axilla on mammography. Further evaluation with right MLO spot compression view with tomosynthesis and possible targeted right breast ultrasound was recommended. Newly visualized small hypoechoic mass in the right breast 12:00 retroareolar location on sonography. Additional imaging done on 11/2022 showed indeterminate hypoechoic mass in the right breast at 12:00, retroareolar for which ultrasound-guided core needle biopsy is recommended. She underwent on 11/11/22 - underwent right breast 12:00 retroareolar biopsy which showed invasive moderately differentiated mammary Carcinoma (measures at least 5 mm), Dennise score 6/9, LCIS classic type. Lymphovascular permeation by tumor not seen. ER + (99%) [place] + (30%) HER2 negative. On 12/6/22, she underwent RIGHT Lumpectomy, RIGHT SLNB, and tissue transfer with Dr. S. Reyes. The pathology showed infiltrating lobular carcinoma, classic type, measuring 2 mm in greatest dimension. Carmen score 6/9. LCIS, classic type. Four sentinel lymph nodes negative (0/4). Right breast superior margin with LCIS, classic type. She completed RT with Dr France and started anastrozole 3/1/2023. \par   [de-identified] : [employer] 47 gene panel 11/2022: no actionable mutations \par  anastrozole 3/1/2023 to present  [de-identified] :  infiltrating lobular carcinoma ER + (99%) [place] + (30%) HER2 negative [de-identified] : She will be seeing Dr Dominguez in June. She has arthralgias over the thumbs and diffuse: worse with wet weather. [place] She had her mammogram done in October and recent MRI of the breast done on 12/2023. Denies any new breast pain or chest wall changes. No back pain, cough or HA. No new medications or health changes. Wondering about best diet for cancer diagnosis.

## 2024-03-18 ENCOUNTER — NON-APPOINTMENT (OUTPATIENT)
Age: 69
End: 2024-03-18

## 2024-03-20 ENCOUNTER — APPOINTMENT (OUTPATIENT)
Dept: ULTRASOUND IMAGING | Facility: CLINIC | Age: 69
End: 2024-03-20
Payer: MEDICARE

## 2024-03-20 ENCOUNTER — OUTPATIENT (OUTPATIENT)
Dept: OUTPATIENT SERVICES | Facility: HOSPITAL | Age: 69
LOS: 1 days | End: 2024-03-20
Payer: MEDICARE

## 2024-03-20 DIAGNOSIS — Z98.51 TUBAL LIGATION STATUS: Chronic | ICD-10-CM

## 2024-03-20 DIAGNOSIS — K76.9 LIVER DISEASE, UNSPECIFIED: ICD-10-CM

## 2024-03-20 DIAGNOSIS — C50.911 MALIGNANT NEOPLASM OF UNSPECIFIED SITE OF RIGHT FEMALE BREAST: ICD-10-CM

## 2024-03-20 PROCEDURE — 76700 US EXAM ABDOM COMPLETE: CPT | Mod: 26

## 2024-03-20 PROCEDURE — 76700 US EXAM ABDOM COMPLETE: CPT

## 2024-06-10 ENCOUNTER — APPOINTMENT (OUTPATIENT)
Dept: INTERNAL MEDICINE | Facility: CLINIC | Age: 69
End: 2024-06-10

## 2024-06-10 VITALS
TEMPERATURE: 97.7 F | HEART RATE: 67 BPM | SYSTOLIC BLOOD PRESSURE: 189 MMHG | WEIGHT: 131 LBS | OXYGEN SATURATION: 97 % | DIASTOLIC BLOOD PRESSURE: 73 MMHG | BODY MASS INDEX: 21.83 KG/M2 | HEIGHT: 65 IN

## 2024-06-10 VITALS — DIASTOLIC BLOOD PRESSURE: 80 MMHG | SYSTOLIC BLOOD PRESSURE: 170 MMHG

## 2024-06-10 DIAGNOSIS — Z85.3 PERSONAL HISTORY OF MALIGNANT NEOPLASM OF BREAST: ICD-10-CM

## 2024-06-10 DIAGNOSIS — I10 ESSENTIAL (PRIMARY) HYPERTENSION: ICD-10-CM

## 2024-06-10 DIAGNOSIS — Z00.00 ENCOUNTER FOR GENERAL ADULT MEDICAL EXAMINATION W/OUT ABNORMAL FINDINGS: ICD-10-CM

## 2024-06-10 DIAGNOSIS — E78.5 HYPERLIPIDEMIA, UNSPECIFIED: ICD-10-CM

## 2024-06-10 PROCEDURE — G0439: CPT

## 2024-06-10 NOTE — HISTORY OF PRESENT ILLNESS
[de-identified] : 68 yo woman for CPE h/o Right breast ca. s/p partial mastec/RT/Anastrazole; has some lymphedema right breast, mild. Did PT now does self- massage. Htn, white coat component; mild HLD, mild O.penia  Pt is seeing Cardiol Dr. Ferrara, is on Benicar 40 and Metopr 50 bid. Home readings vary but mostly normal, highest ~ 137/71- 146/72=  Def goes up when stressed. She is watching her salt intake. Dexa  10/23 nl [place] '19 due '29 MRI Breast having toorrow; mammo due 10/24 Optho due Derm: uptodate Gyn uptodate

## 2024-06-10 NOTE — HEALTH RISK ASSESSMENT
[Good] : ~his/her~  mood as  good [Yes] : Yes [Never] : Never [No falls in past year] : Patient reported no falls in the past year [Patient reported mammogram was normal] : Patient reported mammogram was normal [Patient reported bone density results were normal] : Patient reported bone density results were normal [Patient reported colonoscopy was normal] : Patient reported colonoscopy was normal [None] : None [With Significant Other] : lives with significant other [Employed] : employed [] :  [# Of Children ___] : has [unfilled] children [Feels Safe at Home] : Feels safe at home [Fully functional (bathing, dressing, toileting, transferring, walking, feeding)] : Fully functional (bathing, dressing, toileting, transferring, walking, feeding) [Fully functional (using the telephone, shopping, preparing meals, housekeeping, doing laundry, using] : Fully functional and needs no help or supervision to perform IADLs (using the telephone, shopping, preparing meals, housekeeping, doing laundry, using transportation, managing medications and managing finances) [Smoke Detector] : smoke detector [Carbon Monoxide Detector] : carbon monoxide detector [Safety elements used in home] : safety elements used in home [Seat Belt] :  uses seat belt [de-identified] : 1 -2 glasses /night [de-identified] : exercises [de-identified] : healthy [Change in mental status noted] : No change in mental status noted [Language] : denies difficulty with language [High Risk Behavior] : no high risk behavior [Reports changes in hearing] : Reports no changes in hearing [Reports changes in vision] : Reports no changes in vision [Reports changes in dental health] : Reports no changes in dental health [MammogramDate] : 10/23 [PapSmearDate] : 10/23 [BoneDensityDate] : 10/23 [ColonoscopyDate] : 2019 [FreeTextEntry2] :  home

## 2024-06-10 NOTE — ASSESSMENT
[FreeTextEntry1] : Pt as outlined. BP elevated, she just had stressful work interaction and also has white coat. She will monitor at home and let me know. Check routine labs when fasting, needs Prevnar 20 will take next wk (has MRI breast tomorrow)  Shingrix at pharm  Optho due

## 2024-06-10 NOTE — PHYSICAL EXAM
[No Acute Distress] : no acute distress [Well Nourished] : well nourished [Well Developed] : well developed [Well-Appearing] : well-appearing [Normal Sclera/Conjunctiva] : normal sclera/conjunctiva [PERRL] : pupils equal round and reactive to light [EOMI] : extraocular movements intact [Normal Outer Ear/Nose] : the outer ears and nose were normal in appearance [Normal Oropharynx] : the oropharynx was normal [No JVD] : no jugular venous distention [No Lymphadenopathy] : no lymphadenopathy [Supple] : supple [Thyroid Normal, No Nodules] : the thyroid was normal and there were no nodules present [No Respiratory Distress] : no respiratory distress  [No Accessory Muscle Use] : no accessory muscle use [Clear to Auscultation] : lungs were clear to auscultation bilaterally [Normal Rate] : normal rate  [Regular Rhythm] : with a regular rhythm [Normal S1, S2] : normal S1 and S2 [No Murmur] : no murmur heard [No Carotid Bruits] : no carotid bruits [No Abdominal Bruit] : a ~M bruit was not heard ~T in the abdomen [No Varicosities] : no varicosities [Pedal Pulses Present] : the pedal pulses are present [No Edema] : there was no peripheral edema [No Palpable Aorta] : no palpable aorta [No Extremity Clubbing/Cyanosis] : no extremity clubbing/cyanosis [Soft] : abdomen soft [Non Tender] : non-tender [Non-distended] : non-distended [No Masses] : no abdominal mass palpated [No HSM] : no HSM [Normal Bowel Sounds] : normal bowel sounds [Normal Posterior Cervical Nodes] : no posterior cervical lymphadenopathy [Normal Anterior Cervical Nodes] : no anterior cervical lymphadenopathy [No CVA Tenderness] : no CVA  tenderness [No Spinal Tenderness] : no spinal tenderness [No Joint Swelling] : no joint swelling [Grossly Normal Strength/Tone] : grossly normal strength/tone [No Rash] : no rash [Coordination Grossly Intact] : coordination grossly intact [No Focal Deficits] : no focal deficits [Normal Gait] : normal gait [Deep Tendon Reflexes (DTR)] : deep tendon reflexes were 2+ and symmetric [Normal Affect] : the affect was normal [Normal Insight/Judgement] : insight and judgment were intact [de-identified] : just had stressful interaction at work (high BP)

## 2024-06-11 ENCOUNTER — TRANSCRIPTION ENCOUNTER (OUTPATIENT)
Age: 69
End: 2024-06-11

## 2024-06-11 ENCOUNTER — OUTPATIENT (OUTPATIENT)
Dept: OUTPATIENT SERVICES | Facility: HOSPITAL | Age: 69
LOS: 1 days | End: 2024-06-11
Payer: MEDICARE

## 2024-06-11 ENCOUNTER — APPOINTMENT (OUTPATIENT)
Dept: MRI IMAGING | Facility: IMAGING CENTER | Age: 69
End: 2024-06-11

## 2024-06-11 DIAGNOSIS — R92.8 OTHER ABNORMAL AND INCONCLUSIVE FINDINGS ON DIAGNOSTIC IMAGING OF BREAST: ICD-10-CM

## 2024-06-11 DIAGNOSIS — Z98.51 TUBAL LIGATION STATUS: Chronic | ICD-10-CM

## 2024-06-11 PROCEDURE — A9585: CPT

## 2024-06-11 PROCEDURE — C8908: CPT

## 2024-06-11 PROCEDURE — 77049 MRI BREAST C-+ W/CAD BI: CPT | Mod: 26

## 2024-06-11 PROCEDURE — C8937: CPT

## 2024-06-17 ENCOUNTER — NON-APPOINTMENT (OUTPATIENT)
Age: 69
End: 2024-06-17

## 2024-06-18 ENCOUNTER — APPOINTMENT (OUTPATIENT)
Dept: SURGICAL ONCOLOGY | Facility: CLINIC | Age: 69
End: 2024-06-18

## 2024-06-18 VITALS
HEART RATE: 60 BPM | SYSTOLIC BLOOD PRESSURE: 150 MMHG | RESPIRATION RATE: 17 BRPM | DIASTOLIC BLOOD PRESSURE: 72 MMHG | BODY MASS INDEX: 21.66 KG/M2 | HEIGHT: 65 IN | WEIGHT: 130 LBS | OXYGEN SATURATION: 99 %

## 2024-06-18 PROCEDURE — 99212 OFFICE O/P EST SF 10 MIN: CPT

## 2024-06-18 NOTE — PHYSICAL EXAM
[Normal] : supple, no neck mass and thyroid not enlarged [Normal Neck Lymph Nodes] : normal neck lymph nodes  [Normal Supraclavicular Lymph Nodes] : normal supraclavicular lymph nodes [Normal Axillary Lymph Nodes] : normal axillary lymph nodes [Normal] : oriented to person, place and time, with appropriate affect [de-identified] : Right breast incision is well-healed with no parenchymal defect. Right nipple with mild post-radiation edema.  Bilateral breasts with no palpable masses.  The right axillary incision is well healed with no seroma.  no erythema  [de-identified] : Normal respiratory effort

## 2024-06-18 NOTE — RESULTS/DATA
[FreeTextEntry1] : Breast rad/path review:  7/10/20 BL SC MG/US - birads2 - heterogeneously dense 10/27/21 BL Sc MG/US - birads 2 - scattered density 11/2/22 BL Sc MG/US - birads0 - scattered density, R 12:00RA 4mm mass. Left breast-ok  11/11/22 R Dx MG/US - birads4 - R 12:00RA 5 mm angular hypoechoic mass. Right axilla-ok  11/11/22 R US Guided Bx: 1. R 12:00 retro areolar core bx: Invasive mammary carcinoma, Grade2, ER99%, PR30%, HER2 Neg; ribbon clip. Concordant.   12/6/22 s/p Right Vera loc PM, R SLNBx  (0/4), tissue transfer. pT1a pN0   Surgical Path:  1. Right breast, 12:00 mass, Lumpectomy: Infiltrating lobular carcinoma, classic type, measuring 2 mm in greatest dimension. 2. Right breast superior margin: Lobular carcinoma in situ, classic type. 3. Right breast lateral, inferior, medial, posterior margin: Benign breast tissue.  6/2/23 Breast MRI: BIRADs 3.  - L LOQ 5 mm T2 hyperintense dominant focus of enhancement (Rec 6-month f/u MRI to ascertain stability) - R post lumpectomy and post-treatment changes. Unilateral edema pattern w/ mild asymmetric diffuse enhancement of breast parenchyma and dermis - ( Rec correlation w/ final surgical pathology margins) - 1.8 cm T2 hyperintense non-enhancing hepatic lesion likely representing cyst (Rec US of abdomen)    10/30/23 BL Dx MG/ R Dx US - birad 2 - scattered fibroglandular densities. R 12:00 post-op scarring and superficial oil cyst (rec 1yr MG) 12/11/23 Breast MRI: birad 3 - R stable postsurgical change. mild diffuse edema of breast and skin, mildly improved - Stable L 4:00 5mm enhancing mass (rec 6mo f/u MRI for stability) - L 1.8 cm hepatic lobe cyst   3/20/24 Abdominal US: Multiple BL lobar hepatic cysts up to 2.6 cm in the R lobe. No suspicious liver lesions.   6/11/24 Breast MRI: birads 3.  - Stable R breast and axillary postsurgical changes, skin changes c/w radiation therapy changes.  - Stable L 4:00 0.5 cm enhancing mass since 6/2/2023. (Rec additional 1-year MRI to ensure 2-year stability).

## 2024-06-18 NOTE — HISTORY OF PRESENT ILLNESS
[de-identified] : RICARDA ALFARO is a 70 y/o F w/ hx of R Invasive mammary carcinoma +/+/- dx 11/2022 [cT1 cN0-> pT1a pN0] s/p R PM, R SLNbx (0/4), TT on 12/6/22 s/p XRT on adjuvant anastrozole. Here for 6-month follow up.   Fam hx: MAunt breast cancer 55, MGM ovarian cancer 89 - Genetic panel Negative  11/19/22 [facility] Genetic hereditary panel (47 genes): Negative   Interval Course   01/11/2023 Patient established cared with Dr. France 01/11/2023 Patient established cared with Dr. Jeronimo Merlos  1/25/2023- 2/15/2023 Completed Radiation therapy to Right breast  03/01/2023 Started Anastrozole  3/16: Since Last visit: Completed Right breast XRT, started on anastrozole - tolerating it well.  States she has occasional intermittent Right axillary discomfort/ twinges that really doesn't bother her - Working, doing her ADLs. She reports some R UIQ tenderness that she noticed but denies feeling any masses.  6/20/23: Since Last visit has undergone a Breast MRI rated birads 3. At this visit is c/o some right breast discomfort after XRT along the inner and outer quadrants. 12/19/23 Since last visit has undergone BL Dx MG/ R Dx US rated birads 2. Breast MRI rated BR 3 for stable left 4:00 5 mm enhancing mass. Reports continued right breast fullness, improved after PT though still intermittently causing discomfort. Tolerating anastrozole.  3/15/24 Followed up with Dr. Merlos with abdominal US ordered to further evaluate liver lesion on breast MRI, likely liver cysts.  6/18/24 Since last visit has undergone breast MRI rated birads 3. No new breast complaints.

## 2024-06-18 NOTE — ASSESSMENT
[FreeTextEntry1] : RICARDA ALFARO is a 70 y/o F w/ hx of R Invasive mammary carcinoma +/+/- dx 11/2022 [cT1 cN0-> pT1a pN0] s/p R PM, R SLNbx (0/4), TT on 12/6/22. [R ILC classic type, LCIS. Right superior margin w/ LCIS, classic type] s/p XRT on adjuvant anastrozole. Here for 6-month follow up.   Oncotype Dx: Not sent out given small size of invasive tumor (insufficient tissue) 11/19/22 [facility] (full panel): Negative  Clinical breast exam today is benign and with no suspicious findings.   - Next imaging: Annual BL Dx MG/ R Dx US due November 2024, 1-year f/u Breast MRI due June 2025.  - Radiation oncology (Dr. France): completed XRT to Right breast on 2/15/2023 - Medical oncology (Dr. Jeronimo Merlos): Adjuvant endocrine therapy, Started anastrozole on 3/1/23. - Return to office in 6 months. She knows to return sooner if any breast abnormalities or if any breast issues/concerns arise.

## 2024-06-19 LAB
25(OH)D3 SERPL-MCNC: 42.5 NG/ML
ALBUMIN SERPL ELPH-MCNC: 4.4 G/DL
ALP BLD-CCNC: 54 U/L
ALT SERPL-CCNC: 19 U/L
ANION GAP SERPL CALC-SCNC: 12 MMOL/L
AST SERPL-CCNC: 17 U/L
BILIRUB SERPL-MCNC: 0.4 MG/DL
BUN SERPL-MCNC: 18 MG/DL
CALCIUM SERPL-MCNC: 10 MG/DL
CHLORIDE SERPL-SCNC: 106 MMOL/L
CHOLEST SERPL-MCNC: 235 MG/DL
CO2 SERPL-SCNC: 25 MMOL/L
CREAT SERPL-MCNC: 0.87 MG/DL
EGFR: 73 ML/MIN/1.73M2
ESTIMATED AVERAGE GLUCOSE: 114 MG/DL
GLUCOSE SERPL-MCNC: 93 MG/DL
HBA1C MFR BLD HPLC: 5.6 %
HCT VFR BLD CALC: 40.5 %
HDLC SERPL-MCNC: 73 MG/DL
HGB BLD-MCNC: 13.1 G/DL
LDLC SERPL CALC-MCNC: 149 MG/DL
MCHC RBC-ENTMCNC: 31.3 PG
MCHC RBC-ENTMCNC: 32.3 GM/DL
MCV RBC AUTO: 96.7 FL
NONHDLC SERPL-MCNC: 163 MG/DL
PLATELET # BLD AUTO: 305 K/UL
POTASSIUM SERPL-SCNC: 4.8 MMOL/L
PROT SERPL-MCNC: 7 G/DL
RBC # BLD: 4.19 M/UL
RBC # FLD: 12.8 %
SODIUM SERPL-SCNC: 143 MMOL/L
TRIGL SERPL-MCNC: 81 MG/DL
TSH SERPL-ACNC: 2.5 UIU/ML
WBC # FLD AUTO: 4.98 K/UL

## 2024-07-24 ENCOUNTER — OFFICE (OUTPATIENT)
Dept: URBAN - METROPOLITAN AREA CLINIC 90 | Facility: CLINIC | Age: 69
Setting detail: OPHTHALMOLOGY
End: 2024-07-24
Payer: MEDICARE

## 2024-07-24 DIAGNOSIS — H25.13: ICD-10-CM

## 2024-07-24 DIAGNOSIS — H01.002: ICD-10-CM

## 2024-07-24 DIAGNOSIS — H01.005: ICD-10-CM

## 2024-07-24 DIAGNOSIS — H35.371: ICD-10-CM

## 2024-07-24 DIAGNOSIS — H43.811: ICD-10-CM

## 2024-07-24 DIAGNOSIS — H01.004: ICD-10-CM

## 2024-07-24 DIAGNOSIS — H01.001: ICD-10-CM

## 2024-07-24 PROCEDURE — 92004 COMPRE OPH EXAM NEW PT 1/>: CPT | Performed by: OPHTHALMOLOGY

## 2024-07-24 ASSESSMENT — LID EXAM ASSESSMENTS
OD_BLEPHARITIS: RLL RUL 1+
OS_BLEPHARITIS: LLL LUL 1+

## 2024-07-24 ASSESSMENT — CONFRONTATIONAL VISUAL FIELD TEST (CVF)
OD_FINDINGS: FULL
OS_FINDINGS: FULL

## 2024-08-15 ENCOUNTER — RX RENEWAL (OUTPATIENT)
Age: 69
End: 2024-08-15

## 2024-09-04 ENCOUNTER — OFFICE (OUTPATIENT)
Dept: URBAN - METROPOLITAN AREA CLINIC 90 | Facility: CLINIC | Age: 69
Setting detail: OPHTHALMOLOGY
End: 2024-09-04
Payer: MEDICARE

## 2024-09-04 DIAGNOSIS — H01.002: ICD-10-CM

## 2024-09-04 DIAGNOSIS — H01.005: ICD-10-CM

## 2024-09-04 DIAGNOSIS — H43.811: ICD-10-CM

## 2024-09-04 DIAGNOSIS — H01.001: ICD-10-CM

## 2024-09-04 DIAGNOSIS — H01.004: ICD-10-CM

## 2024-09-04 DIAGNOSIS — H25.13: ICD-10-CM

## 2024-09-04 PROCEDURE — 92014 COMPRE OPH EXAM EST PT 1/>: CPT | Performed by: OPHTHALMOLOGY

## 2024-09-04 ASSESSMENT — CONFRONTATIONAL VISUAL FIELD TEST (CVF)
OD_FINDINGS: FULL
OS_FINDINGS: FULL

## 2024-09-04 ASSESSMENT — LID EXAM ASSESSMENTS
OD_BLEPHARITIS: RLL RUL 1+
OS_BLEPHARITIS: LLL LUL 1+

## 2024-09-10 ENCOUNTER — TRANSCRIPTION ENCOUNTER (OUTPATIENT)
Age: 69
End: 2024-09-10

## 2024-09-15 ENCOUNTER — OUTPATIENT (OUTPATIENT)
Dept: OUTPATIENT SERVICES | Facility: HOSPITAL | Age: 69
LOS: 1 days | Discharge: HOME | End: 2024-09-15

## 2024-09-15 DIAGNOSIS — C50.911 MALIGNANT NEOPLASM OF UNSPECIFIED SITE OF RIGHT FEMALE BREAST: ICD-10-CM

## 2024-09-15 DIAGNOSIS — Z98.51 TUBAL LIGATION STATUS: Chronic | ICD-10-CM

## 2024-09-22 ENCOUNTER — NON-APPOINTMENT (OUTPATIENT)
Age: 69
End: 2024-09-22

## 2024-09-23 ENCOUNTER — APPOINTMENT (OUTPATIENT)
Dept: HEMATOLOGY ONCOLOGY | Facility: CLINIC | Age: 69
End: 2024-09-23
Payer: MEDICARE

## 2024-09-23 VITALS
RESPIRATION RATE: 16 BRPM | TEMPERATURE: 97.1 F | DIASTOLIC BLOOD PRESSURE: 77 MMHG | WEIGHT: 134.48 LBS | SYSTOLIC BLOOD PRESSURE: 184 MMHG | HEART RATE: 62 BPM | OXYGEN SATURATION: 98 % | BODY MASS INDEX: 22.38 KG/M2

## 2024-09-23 DIAGNOSIS — M25.60 STIFFNESS OF UNSPECIFIED JOINT, NOT ELSEWHERE CLASSIFIED: ICD-10-CM

## 2024-09-23 DIAGNOSIS — Z17.0 MALIGNANT NEOPLASM OF UNSPECIFIED SITE OF RIGHT FEMALE BREAST: ICD-10-CM

## 2024-09-23 DIAGNOSIS — C50.911 MALIGNANT NEOPLASM OF UNSPECIFIED SITE OF RIGHT FEMALE BREAST: ICD-10-CM

## 2024-09-23 PROCEDURE — G2211 COMPLEX E/M VISIT ADD ON: CPT

## 2024-09-23 PROCEDURE — 99213 OFFICE O/P EST LOW 20 MIN: CPT

## 2024-09-23 NOTE — HISTORY OF PRESENT ILLNESS
[Disease: _____________________] : Disease: [unfilled] [T: ___] : T[unfilled] [N: ___] : N[unfilled] [AJCC Stage: ____] : AJCC Stage: [unfilled] [de-identified] : Age 67: right breast cancer\par  Screen detected: she had her interval breast imaging done on 10/28/22 which showed asymmetry in the low right axilla on mammography. Further evaluation with right MLO spot compression view with tomosynthesis and possible targeted right breast ultrasound was recommended. Newly visualized small hypoechoic mass in the right breast 12:00 retroareolar location on sonography. Additional imaging done on 11/2022 showed indeterminate hypoechoic mass in the right breast at 12:00, retroareolar for which ultrasound-guided core needle biopsy is recommended. She underwent on 11/11/22 - underwent right breast 12:00 retroareolar biopsy which showed invasive moderately differentiated mammary Carcinoma (measures at least 5 mm), Dennise score 6/9, LCIS classic type. Lymphovascular permeation by tumor not seen. ER + (99%) MO + (30%) HER2 negative. On 12/6/22, she underwent RIGHT Lumpectomy, RIGHT SLNB, and tissue transfer with Dr. S. Reyes. The pathology showed infiltrating lobular carcinoma, classic type, measuring 2 mm in greatest dimension. Flanders score 6/9. LCIS, classic type. Four sentinel lymph nodes negative (0/4). Right breast superior margin with LCIS, classic type. She completed RT with Dr France and started anastrozole 3/1/2023. \par   [de-identified] :  infiltrating lobular carcinoma ER + (99%) IN + (30%) HER2 negative [de-identified] : [facility] 47 gene panel 11/2022: no actionable mutations \par  anastrozole 3/1/2023 to present  [de-identified] : Has been noticing more stiffness of the joints but admits to more inactivity. Denies any new breast pain or chest wall changes. No back pain, cough or HA. Will be going for mammogram/ sonogram in November. She had blood work done with PCP in June. No new medications. Tolerating anastrozole well otherwise.

## 2024-09-23 NOTE — PHYSICAL EXAM
[Fully active, able to carry on all pre-disease performance without restriction] : Status 0 - Fully active, able to carry on all pre-disease performance without restriction [Normal] : affect appropriate [de-identified] : right lumpectomy, no abnl masses or axillary LN palpable

## 2024-09-23 NOTE — PHYSICAL EXAM
[Fully active, able to carry on all pre-disease performance without restriction] : Status 0 - Fully active, able to carry on all pre-disease performance without restriction [Normal] : affect appropriate [de-identified] : right lumpectomy, no abnl masses or axillary LN palpable

## 2024-09-23 NOTE — HISTORY OF PRESENT ILLNESS
[Disease: _____________________] : Disease: [unfilled] [T: ___] : T[unfilled] [N: ___] : N[unfilled] [AJCC Stage: ____] : AJCC Stage: [unfilled] [de-identified] : Age 67: right breast cancer\par  Screen detected: she had her interval breast imaging done on 10/28/22 which showed asymmetry in the low right axilla on mammography. Further evaluation with right MLO spot compression view with tomosynthesis and possible targeted right breast ultrasound was recommended. Newly visualized small hypoechoic mass in the right breast 12:00 retroareolar location on sonography. Additional imaging done on 11/2022 showed indeterminate hypoechoic mass in the right breast at 12:00, retroareolar for which ultrasound-guided core needle biopsy is recommended. She underwent on 11/11/22 - underwent right breast 12:00 retroareolar biopsy which showed invasive moderately differentiated mammary Carcinoma (measures at least 5 mm), Dennise score 6/9, LCIS classic type. Lymphovascular permeation by tumor not seen. ER + (99%) LA + (30%) HER2 negative. On 12/6/22, she underwent RIGHT Lumpectomy, RIGHT SLNB, and tissue transfer with Dr. S. Reyes. The pathology showed infiltrating lobular carcinoma, classic type, measuring 2 mm in greatest dimension. Roodhouse score 6/9. LCIS, classic type. Four sentinel lymph nodes negative (0/4). Right breast superior margin with LCIS, classic type. She completed RT with Dr France and started anastrozole 3/1/2023. \par   [de-identified] :  infiltrating lobular carcinoma ER + (99%) PA + (30%) HER2 negative [de-identified] : [employer] 47 gene panel 11/2022: no actionable mutations \par  anastrozole 3/1/2023 to present  [de-identified] : Has been noticing more stiffness of the joints but admits to more inactivity. Denies any new breast pain or chest wall changes. No back pain, cough or HA. Will be going for mammogram/ sonogram in November. She had blood work done with PCP in June. No new medications. Tolerating anastrozole well otherwise.

## 2024-09-23 NOTE — ASSESSMENT
[FreeTextEntry1] : She is a 69 y/o F with right Stage I invasive lobular carcinoma ER + (99%) HI + (30%) HER2 negative s/p lumpectomy and SLNB. She completed RT to the breast with Dr France and started on anastrozole 3/1/2023. She expected Grade 1 joint stiffness. Reviewed supportive measures: exercise and omega 3/ turmeric. She will have breast imaging in November. We reviewed signs and symptoms of breast cancer recurrence. No new signs or symptoms of recurrence. LFTs WNL from 6/2024 blood work. Next follow up in 6 months but earlier if any new symptoms.  Bone density: osteopenia: stable: next bone density due 2025.   HCP: will be naming her : form given to her to fill out.

## 2024-09-23 NOTE — REVIEW OF SYSTEMS
[Diarrhea: Grade 0] : Diarrhea: Grade 0 [Joint Stiffness] : joint stiffness [Negative] : Allergic/Immunologic [Joint Pain] : no joint pain [Muscle Pain] : no muscle pain [Muscle Weakness] : no muscle weakness

## 2024-09-23 NOTE — BEGINNING OF VISIT
[0] : 2) Feeling down, depressed, or hopeless: Not at all (0) [PHQ-2 Negative] : PHQ-2 Negative [Pain Scale: ___] : On a scale of 1-10, today the patient's pain is a(n) [unfilled]. [Former] : Former [> 15 Years] : > 15 Years [Date Discussed (MM/DD/YY): ___] : Discussed: [unfilled] [With Patient/Caregiver] : with Patient/Caregiver

## 2024-10-09 ENCOUNTER — APPOINTMENT (OUTPATIENT)
Dept: OBGYN | Facility: CLINIC | Age: 69
End: 2024-10-09
Payer: MEDICARE

## 2024-10-09 VITALS
DIASTOLIC BLOOD PRESSURE: 84 MMHG | HEIGHT: 65 IN | BODY MASS INDEX: 21.66 KG/M2 | SYSTOLIC BLOOD PRESSURE: 159 MMHG | WEIGHT: 130 LBS

## 2024-10-09 DIAGNOSIS — Z01.419 ENCOUNTER FOR GYNECOLOGICAL EXAMINATION (GENERAL) (ROUTINE) W/OUT ABNORMAL FINDINGS: ICD-10-CM

## 2024-10-09 PROCEDURE — G0101: CPT

## 2024-10-10 LAB — HPV HIGH+LOW RISK DNA PNL CVX: NOT DETECTED

## 2024-10-14 LAB — CYTOLOGY CVX/VAG DOC THIN PREP: ABNORMAL

## 2024-11-06 ENCOUNTER — RESULT REVIEW (OUTPATIENT)
Age: 69
End: 2024-11-06

## 2024-11-06 ENCOUNTER — APPOINTMENT (OUTPATIENT)
Dept: MAMMOGRAPHY | Facility: IMAGING CENTER | Age: 69
End: 2024-11-06
Payer: MEDICARE

## 2024-11-06 ENCOUNTER — APPOINTMENT (OUTPATIENT)
Dept: ULTRASOUND IMAGING | Facility: IMAGING CENTER | Age: 69
End: 2024-11-06
Payer: MEDICARE

## 2024-11-06 ENCOUNTER — OUTPATIENT (OUTPATIENT)
Dept: OUTPATIENT SERVICES | Facility: HOSPITAL | Age: 69
LOS: 1 days | End: 2024-11-06
Payer: MEDICARE

## 2024-11-06 DIAGNOSIS — Z98.51 TUBAL LIGATION STATUS: Chronic | ICD-10-CM

## 2024-11-06 DIAGNOSIS — Z85.3 PERSONAL HISTORY OF MALIGNANT NEOPLASM OF BREAST: ICD-10-CM

## 2024-11-06 DIAGNOSIS — Z00.8 ENCOUNTER FOR OTHER GENERAL EXAMINATION: ICD-10-CM

## 2024-11-06 PROCEDURE — 76641 ULTRASOUND BREAST COMPLETE: CPT | Mod: 26,RT,GA

## 2024-11-06 PROCEDURE — 77066 DX MAMMO INCL CAD BI: CPT | Mod: 26

## 2024-11-06 PROCEDURE — G0279: CPT | Mod: 26

## 2024-11-13 ENCOUNTER — NON-APPOINTMENT (OUTPATIENT)
Age: 69
End: 2024-11-13

## 2024-11-20 PROCEDURE — G0279: CPT

## 2024-11-20 PROCEDURE — 76641 ULTRASOUND BREAST COMPLETE: CPT

## 2024-11-20 PROCEDURE — 77066 DX MAMMO INCL CAD BI: CPT

## 2024-11-26 ENCOUNTER — APPOINTMENT (OUTPATIENT)
Dept: SURGICAL ONCOLOGY | Facility: CLINIC | Age: 69
End: 2024-11-26

## 2024-11-26 VITALS
SYSTOLIC BLOOD PRESSURE: 156 MMHG | BODY MASS INDEX: 21.66 KG/M2 | WEIGHT: 130 LBS | DIASTOLIC BLOOD PRESSURE: 83 MMHG | OXYGEN SATURATION: 96 % | HEIGHT: 65 IN

## 2024-11-26 PROCEDURE — 99213 OFFICE O/P EST LOW 20 MIN: CPT

## 2025-02-06 ENCOUNTER — RX RENEWAL (OUTPATIENT)
Age: 70
End: 2025-02-06

## (undated) DEVICE — PACK MAJOR ABDOMINAL WITH LAP

## (undated) DEVICE — SUT POLYSORB 3-0 30" V-20 UNDYED

## (undated) DEVICE — SUT SOFSILK 2-0 18" C-23

## (undated) DEVICE — GLV 7.5 PROTEXIS (WHITE)

## (undated) DEVICE — DRAPE INSTRUMENT POUCH 6.75" X 11"

## (undated) DEVICE — DRSG OPSITE 13.75 X 4"

## (undated) DEVICE — SAVI SCOUT HANDPIECE

## (undated) DEVICE — DRSG OPSITE 2.5 X 2"

## (undated) DEVICE — DRAIN RESERVOIR FOR JACKSON PRATT 100CC CARDINAL

## (undated) DEVICE — SOL IRR POUR H2O 250ML

## (undated) DEVICE — DRAPE 1/2 SHEET 40X57"

## (undated) DEVICE — SUT MONOCRYL 4-0 27" PS-2 UNDYED

## (undated) DEVICE — WARMING BLANKET LOWER ADULT

## (undated) DEVICE — PREP CHLORAPREP HI-LITE ORANGE 26ML

## (undated) DEVICE — STAPLER SKIN VISI-STAT 35 WIDE

## (undated) DEVICE — SUT SILK 2-0 18" FS

## (undated) DEVICE — LIGASURE SMALL JAW

## (undated) DEVICE — DRAPE CHEST BREAST 106" X 122"

## (undated) DEVICE — MARKING PEN W RULER

## (undated) DEVICE — DRSG STERISTRIPS 0.5 X 4"

## (undated) DEVICE — SUT VICRYL 3-0 27" SH UNDYED

## (undated) DEVICE — VENODYNE/SCD SLEEVE CALF MEDIUM

## (undated) DEVICE — DRAPE MAYO STAND 30"

## (undated) DEVICE — FOLEY TRAY 16FR 5CC LTX UMETER CLOSED

## (undated) DEVICE — DRAIN JACKSON PRATT 10MM FLAT FULL NO TROCAR

## (undated) DEVICE — SYR ASEPTO

## (undated) DEVICE — BLADE SCALPEL SAFETYLOCK #15

## (undated) DEVICE — DRAIN BLAKE 15FR BARD CHANNEL

## (undated) DEVICE — DRAPE TOWEL BLUE 17" X 24"

## (undated) DEVICE — SUT BIOSYN 4-0 18" P-12

## (undated) DEVICE — MEDICATION LABELS W MARKER

## (undated) DEVICE — DRSG CURITY GAUZE SPONGE 4 X 4" 12-PLY

## (undated) DEVICE — DRAPE 3/4 SHEET 52X76"

## (undated) DEVICE — SUCTION YANKAUER NO CONTROL VENT

## (undated) DEVICE — DRAPE LIGHT HANDLE COVER (BLUE)

## (undated) DEVICE — SOL IRR POUR NS 0.9% 500ML

## (undated) DEVICE — BLADE SCALPEL SAFETYLOCK #10

## (undated) DEVICE — POSITIONER FOAM EGG CRATE ULNAR 2PCS (PINK)

## (undated) DEVICE — DRSG TEGADERM 6"X8"

## (undated) DEVICE — SPECIMEN CONTAINER 100ML